# Patient Record
Sex: MALE | URBAN - METROPOLITAN AREA
[De-identification: names, ages, dates, MRNs, and addresses within clinical notes are randomized per-mention and may not be internally consistent; named-entity substitution may affect disease eponyms.]

---

## 2019-12-14 ENCOUNTER — APPOINTMENT (OUTPATIENT)
Dept: RADIOLOGY | Facility: MEDICAL CENTER | Age: 84
DRG: 246 | End: 2019-12-14
Payer: MEDICARE

## 2019-12-14 ENCOUNTER — APPOINTMENT (OUTPATIENT)
Dept: RADIOLOGY | Facility: MEDICAL CENTER | Age: 84
DRG: 246 | End: 2019-12-14
Attending: EMERGENCY MEDICINE
Payer: MEDICARE

## 2019-12-14 ENCOUNTER — HOSPITAL ENCOUNTER (INPATIENT)
Facility: MEDICAL CENTER | Age: 84
LOS: 3 days | DRG: 246 | End: 2019-12-17
Attending: EMERGENCY MEDICINE | Admitting: HOSPITALIST
Payer: MEDICARE

## 2019-12-14 ENCOUNTER — APPOINTMENT (OUTPATIENT)
Dept: CARDIOLOGY | Facility: MEDICAL CENTER | Age: 84
DRG: 246 | End: 2019-12-14
Payer: MEDICARE

## 2019-12-14 DIAGNOSIS — I21.02 ST ELEVATION MYOCARDIAL INFARCTION INVOLVING LEFT ANTERIOR DESCENDING (LAD) CORONARY ARTERY (HCC): ICD-10-CM

## 2019-12-14 LAB
ALBUMIN SERPL BCP-MCNC: 3.8 G/DL (ref 3.2–4.9)
ALBUMIN/GLOB SERPL: 1 G/DL
ALP SERPL-CCNC: 68 U/L (ref 30–99)
ALT SERPL-CCNC: 10 U/L (ref 2–50)
ANION GAP SERPL CALC-SCNC: 10 MMOL/L (ref 0–11.9)
APTT PPP: 27.5 SEC (ref 24.7–36)
AST SERPL-CCNC: 15 U/L (ref 12–45)
BASOPHILS # BLD AUTO: 0.4 % (ref 0–1.8)
BASOPHILS # BLD: 0.04 K/UL (ref 0–0.12)
BILIRUB SERPL-MCNC: 0.4 MG/DL (ref 0.1–1.5)
BUN SERPL-MCNC: 17 MG/DL (ref 8–22)
CALCIUM SERPL-MCNC: 9.3 MG/DL (ref 8.5–10.5)
CHLORIDE SERPL-SCNC: 107 MMOL/L (ref 96–112)
CO2 SERPL-SCNC: 21 MMOL/L (ref 20–33)
CREAT SERPL-MCNC: 1.2 MG/DL (ref 0.5–1.4)
EKG IMPRESSION: NORMAL
EOSINOPHIL # BLD AUTO: 0.21 K/UL (ref 0–0.51)
EOSINOPHIL NFR BLD: 2 % (ref 0–6.9)
ERYTHROCYTE [DISTWIDTH] IN BLOOD BY AUTOMATED COUNT: 43.6 FL (ref 35.9–50)
GLOBULIN SER CALC-MCNC: 3.8 G/DL (ref 1.9–3.5)
GLUCOSE SERPL-MCNC: 147 MG/DL (ref 65–99)
HCT VFR BLD AUTO: 42.5 % (ref 42–52)
HGB BLD-MCNC: 13.9 G/DL (ref 14–18)
IMM GRANULOCYTES # BLD AUTO: 0.06 K/UL (ref 0–0.11)
IMM GRANULOCYTES NFR BLD AUTO: 0.6 % (ref 0–0.9)
INR PPP: 1.08 (ref 0.87–1.13)
LIPASE SERPL-CCNC: 23 U/L (ref 11–82)
LYMPHOCYTES # BLD AUTO: 3.24 K/UL (ref 1–4.8)
LYMPHOCYTES NFR BLD: 30.7 % (ref 22–41)
MCH RBC QN AUTO: 30.1 PG (ref 27–33)
MCHC RBC AUTO-ENTMCNC: 32.7 G/DL (ref 33.7–35.3)
MCV RBC AUTO: 92 FL (ref 81.4–97.8)
MONOCYTES # BLD AUTO: 0.84 K/UL (ref 0–0.85)
MONOCYTES NFR BLD AUTO: 7.9 % (ref 0–13.4)
NEUTROPHILS # BLD AUTO: 6.18 K/UL (ref 1.82–7.42)
NEUTROPHILS NFR BLD: 58.4 % (ref 44–72)
NRBC # BLD AUTO: 0 K/UL
NRBC BLD-RTO: 0 /100 WBC
NT-PROBNP SERPL IA-MCNC: 64 PG/ML (ref 0–125)
PLATELET # BLD AUTO: 237 K/UL (ref 164–446)
PMV BLD AUTO: 8.6 FL (ref 9–12.9)
POTASSIUM SERPL-SCNC: 4 MMOL/L (ref 3.6–5.5)
PROT SERPL-MCNC: 7.6 G/DL (ref 6–8.2)
PROTHROMBIN TIME: 14.3 SEC (ref 12–14.6)
RBC # BLD AUTO: 4.62 M/UL (ref 4.7–6.1)
SODIUM SERPL-SCNC: 138 MMOL/L (ref 135–145)
TROPONIN T SERPL-MCNC: 51 NG/L (ref 6–19)
WBC # BLD AUTO: 10.6 K/UL (ref 4.8–10.8)

## 2019-12-14 PROCEDURE — 99152 MOD SED SAME PHYS/QHP 5/>YRS: CPT | Performed by: INTERNAL MEDICINE

## 2019-12-14 PROCEDURE — 85610 PROTHROMBIN TIME: CPT

## 2019-12-14 PROCEDURE — 92941 PRQ TRLML REVSC TOT OCCL AMI: CPT | Mod: LD | Performed by: INTERNAL MEDICINE

## 2019-12-14 PROCEDURE — 700105 HCHG RX REV CODE 258

## 2019-12-14 PROCEDURE — 99221 1ST HOSP IP/OBS SF/LOW 40: CPT | Mod: 25 | Performed by: INTERNAL MEDICINE

## 2019-12-14 PROCEDURE — 700117 HCHG RX CONTRAST REV CODE 255: Performed by: INTERNAL MEDICINE

## 2019-12-14 PROCEDURE — 84484 ASSAY OF TROPONIN QUANT: CPT

## 2019-12-14 PROCEDURE — 93005 ELECTROCARDIOGRAM TRACING: CPT

## 2019-12-14 PROCEDURE — A9270 NON-COVERED ITEM OR SERVICE: HCPCS

## 2019-12-14 PROCEDURE — B2151ZZ FLUOROSCOPY OF LEFT HEART USING LOW OSMOLAR CONTRAST: ICD-10-PCS | Performed by: INTERNAL MEDICINE

## 2019-12-14 PROCEDURE — 770022 HCHG ROOM/CARE - ICU (200)

## 2019-12-14 PROCEDURE — 99291 CRITICAL CARE FIRST HOUR: CPT

## 2019-12-14 PROCEDURE — 4A023N7 MEASUREMENT OF CARDIAC SAMPLING AND PRESSURE, LEFT HEART, PERCUTANEOUS APPROACH: ICD-10-PCS | Performed by: INTERNAL MEDICINE

## 2019-12-14 PROCEDURE — 83690 ASSAY OF LIPASE: CPT

## 2019-12-14 PROCEDURE — 99153 MOD SED SAME PHYS/QHP EA: CPT

## 2019-12-14 PROCEDURE — 85730 THROMBOPLASTIN TIME PARTIAL: CPT

## 2019-12-14 PROCEDURE — 700111 HCHG RX REV CODE 636 W/ 250 OVERRIDE (IP)

## 2019-12-14 PROCEDURE — B2111ZZ FLUOROSCOPY OF MULTIPLE CORONARY ARTERIES USING LOW OSMOLAR CONTRAST: ICD-10-PCS | Performed by: INTERNAL MEDICINE

## 2019-12-14 PROCEDURE — 85025 COMPLETE CBC W/AUTO DIFF WBC: CPT

## 2019-12-14 PROCEDURE — 71045 X-RAY EXAM CHEST 1 VIEW: CPT

## 2019-12-14 PROCEDURE — 700101 HCHG RX REV CODE 250

## 2019-12-14 PROCEDURE — 700102 HCHG RX REV CODE 250 W/ 637 OVERRIDE(OP)

## 2019-12-14 PROCEDURE — 99223 1ST HOSP IP/OBS HIGH 75: CPT | Mod: AI | Performed by: HOSPITALIST

## 2019-12-14 PROCEDURE — 027136Z DILATION OF CORONARY ARTERY, TWO ARTERIES WITH THREE DRUG-ELUTING INTRALUMINAL DEVICES, PERCUTANEOUS APPROACH: ICD-10-PCS | Performed by: INTERNAL MEDICINE

## 2019-12-14 PROCEDURE — 83880 ASSAY OF NATRIURETIC PEPTIDE: CPT

## 2019-12-14 PROCEDURE — 93005 ELECTROCARDIOGRAM TRACING: CPT | Performed by: EMERGENCY MEDICINE

## 2019-12-14 PROCEDURE — 80053 COMPREHEN METABOLIC PANEL: CPT

## 2019-12-14 PROCEDURE — 93458 L HRT ARTERY/VENTRICLE ANGIO: CPT | Mod: 26,59 | Performed by: INTERNAL MEDICINE

## 2019-12-14 RX ORDER — MIDAZOLAM HYDROCHLORIDE 1 MG/ML
INJECTION INTRAMUSCULAR; INTRAVENOUS
Status: COMPLETED
Start: 2019-12-14 | End: 2019-12-14

## 2019-12-14 RX ORDER — LIDOCAINE HYDROCHLORIDE 20 MG/ML
INJECTION, SOLUTION INFILTRATION; PERINEURAL
Status: COMPLETED
Start: 2019-12-14 | End: 2019-12-14

## 2019-12-14 RX ORDER — BIVALIRUDIN 250 MG/5ML
INJECTION, POWDER, LYOPHILIZED, FOR SOLUTION INTRAVENOUS
Status: COMPLETED
Start: 2019-12-14 | End: 2019-12-14

## 2019-12-14 RX ORDER — VERAPAMIL HYDROCHLORIDE 2.5 MG/ML
INJECTION, SOLUTION INTRAVENOUS
Status: COMPLETED
Start: 2019-12-14 | End: 2019-12-14

## 2019-12-14 RX ORDER — HEPARIN SODIUM,PORCINE 1000/ML
VIAL (ML) INJECTION
Status: COMPLETED
Start: 2019-12-14 | End: 2019-12-14

## 2019-12-14 RX ORDER — ONDANSETRON 2 MG/ML
4 INJECTION INTRAMUSCULAR; INTRAVENOUS EVERY 4 HOURS PRN
Status: DISCONTINUED | OUTPATIENT
Start: 2019-12-14 | End: 2019-12-17 | Stop reason: HOSPADM

## 2019-12-14 RX ORDER — LEVETIRACETAM 500 MG/1
500 TABLET ORAL 2 TIMES DAILY
COMMUNITY

## 2019-12-14 RX ORDER — ATORVASTATIN CALCIUM 40 MG/1
40 TABLET, FILM COATED ORAL
Status: DISCONTINUED | OUTPATIENT
Start: 2019-12-14 | End: 2019-12-17 | Stop reason: HOSPADM

## 2019-12-14 RX ORDER — ONDANSETRON 4 MG/1
4 TABLET, ORALLY DISINTEGRATING ORAL EVERY 4 HOURS PRN
Status: DISCONTINUED | OUTPATIENT
Start: 2019-12-14 | End: 2019-12-17 | Stop reason: HOSPADM

## 2019-12-14 RX ORDER — CLOPIDOGREL BISULFATE 75 MG/1
75 TABLET ORAL DAILY
Status: DISCONTINUED | OUTPATIENT
Start: 2019-12-15 | End: 2019-12-17 | Stop reason: HOSPADM

## 2019-12-14 RX ORDER — AMOXICILLIN 250 MG
2 CAPSULE ORAL 2 TIMES DAILY
Status: DISCONTINUED | OUTPATIENT
Start: 2019-12-14 | End: 2019-12-17 | Stop reason: HOSPADM

## 2019-12-14 RX ORDER — BISACODYL 10 MG
10 SUPPOSITORY, RECTAL RECTAL
Status: DISCONTINUED | OUTPATIENT
Start: 2019-12-14 | End: 2019-12-17 | Stop reason: HOSPADM

## 2019-12-14 RX ORDER — POLYETHYLENE GLYCOL 3350 17 G/17G
1 POWDER, FOR SOLUTION ORAL
Status: DISCONTINUED | OUTPATIENT
Start: 2019-12-14 | End: 2019-12-17 | Stop reason: HOSPADM

## 2019-12-14 RX ORDER — CLOPIDOGREL 300 MG/1
TABLET, FILM COATED ORAL
Status: COMPLETED
Start: 2019-12-14 | End: 2019-12-14

## 2019-12-14 RX ORDER — HEPARIN SODIUM 200 [USP'U]/100ML
INJECTION, SOLUTION INTRAVENOUS
Status: COMPLETED
Start: 2019-12-14 | End: 2019-12-14

## 2019-12-14 RX ADMIN — CLOPIDOGREL BISULFATE 600 MG: 300 TABLET, FILM COATED ORAL at 16:23

## 2019-12-14 RX ADMIN — LIDOCAINE HYDROCHLORIDE: 20 INJECTION, SOLUTION INFILTRATION; PERINEURAL at 15:33

## 2019-12-14 RX ADMIN — HEPARIN SODIUM 2000 UNITS: 200 INJECTION, SOLUTION INTRAVENOUS at 15:38

## 2019-12-14 RX ADMIN — BIVALIRUDIN 250 MG: 250 INJECTION INTRACAVERNOUS at 15:45

## 2019-12-14 RX ADMIN — HEPARIN SODIUM: 1000 INJECTION, SOLUTION INTRAVENOUS; SUBCUTANEOUS at 15:32

## 2019-12-14 RX ADMIN — IOHEXOL 230 ML: 350 INJECTION, SOLUTION INTRAVENOUS at 16:21

## 2019-12-14 RX ADMIN — NITROGLYCERIN 10 ML: 20 INJECTION INTRAVENOUS at 15:33

## 2019-12-14 RX ADMIN — VERAPAMIL HYDROCHLORIDE 2.5 MG: 2.5 INJECTION INTRAVENOUS at 15:33

## 2019-12-14 RX ADMIN — FENTANYL CITRATE 50 MCG: 0.05 INJECTION, SOLUTION INTRAMUSCULAR; INTRAVENOUS at 16:24

## 2019-12-14 RX ADMIN — MIDAZOLAM HYDROCHLORIDE 0.5 MG: 1 INJECTION, SOLUTION INTRAMUSCULAR; INTRAVENOUS at 16:24

## 2019-12-14 SDOH — ECONOMIC STABILITY: FOOD INSECURITY: WITHIN THE PAST 12 MONTHS, YOU WORRIED THAT YOUR FOOD WOULD RUN OUT BEFORE YOU GOT MONEY TO BUY MORE.: NEVER TRUE

## 2019-12-14 SDOH — HEALTH STABILITY: MENTAL HEALTH: HOW OFTEN DO YOU HAVE A DRINK CONTAINING ALCOHOL?: 4 OR MORE TIMES A WEEK

## 2019-12-14 SDOH — HEALTH STABILITY: MENTAL HEALTH: HOW OFTEN DO YOU HAVE 6 OR MORE DRINKS ON ONE OCCASION?: NEVER

## 2019-12-14 SDOH — ECONOMIC STABILITY: FOOD INSECURITY: WITHIN THE PAST 12 MONTHS, THE FOOD YOU BOUGHT JUST DIDN'T LAST AND YOU DIDN'T HAVE MONEY TO GET MORE.: NEVER TRUE

## 2019-12-14 SDOH — HEALTH STABILITY: MENTAL HEALTH: HOW MANY STANDARD DRINKS CONTAINING ALCOHOL DO YOU HAVE ON A TYPICAL DAY?: 1 OR 2

## 2019-12-14 SDOH — ECONOMIC STABILITY: TRANSPORTATION INSECURITY
IN THE PAST 12 MONTHS, HAS THE LACK OF TRANSPORTATION KEPT YOU FROM MEDICAL APPOINTMENTS OR FROM GETTING MEDICATIONS?: NO

## 2019-12-14 SDOH — ECONOMIC STABILITY: TRANSPORTATION INSECURITY
IN THE PAST 12 MONTHS, HAS LACK OF TRANSPORTATION KEPT YOU FROM MEETINGS, WORK, OR FROM GETTING THINGS NEEDED FOR DAILY LIVING?: NO

## 2019-12-14 ASSESSMENT — ENCOUNTER SYMPTOMS
ABDOMINAL PAIN: 0
WHEEZING: 0
NAUSEA: 0
FEVER: 0
LOSS OF CONSCIOUSNESS: 0
HEMOPTYSIS: 0
BRUISES/BLEEDS EASILY: 0
DEPRESSION: 0
COUGH: 0
MYALGIAS: 0
VOMITING: 0
BLURRED VISION: 0
NERVOUS/ANXIOUS: 0
SPEECH CHANGE: 0
CHILLS: 0
EYE DISCHARGE: 0
PALPITATIONS: 0
EYE PAIN: 0

## 2019-12-14 ASSESSMENT — COGNITIVE AND FUNCTIONAL STATUS - GENERAL
SUGGESTED CMS G CODE MODIFIER DAILY ACTIVITY: CH
DAILY ACTIVITIY SCORE: 24
SUGGESTED CMS G CODE MODIFIER MOBILITY: CH
MOBILITY SCORE: 24

## 2019-12-14 ASSESSMENT — PATIENT HEALTH QUESTIONNAIRE - PHQ9
1. LITTLE INTEREST OR PLEASURE IN DOING THINGS: NOT AT ALL
SUM OF ALL RESPONSES TO PHQ9 QUESTIONS 1 AND 2: 0
2. FEELING DOWN, DEPRESSED, IRRITABLE, OR HOPELESS: NOT AT ALL

## 2019-12-14 ASSESSMENT — LIFESTYLE VARIABLES: EVER_SMOKED: NEVER

## 2019-12-14 NOTE — CONSULTS
Cardiology consult    Requested by Dr. 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 secondary to chest pain.  Right bundle branch block with ST elevation anteriorly consistent with an acute anterior wall myocardial infarction.  Unable to tell whether sharp or dull.  Approximate 30 minutes of this.  Patient is quite healthy.  Lives independently, drives, , retired dentist.  Denies smoking or alcohol use.  Has 2 children.  Currently pain has improved slightly less ST elevation compared to the previous EKG. no previous history of MI or stroke.  On Keppra for previous seizures no other medications.  Patient received aspirin in route      MEDICATIONS:      Current Facility-Administered Medications:   •  LIDOCAINE HCL 2 % INJ SOLN, , , ,   •  HEPARIN 1000 UNITS/ML OR USE ONLY, , , ,   •  VERAPAMIL HCL 2.5 MG/ML IV SOLN, , , ,   •  HEPARIN (PORCINE) IN NACL 2000-0.9 UNIT/L-% IV SOLN, , , ,   •  NITROGLYCERIN 2 MG IV SOLN, , , ,   •  HEPARIN 1000 UNITS/ML OR USE ONLY, , , ,   No current outpatient medications on file.    ALLERGIES:   Mr. Monterroso  has no allergies on file.     SURGERIES:  Mr. Monterroso   has no past surgical history on file.     MEDICAL ILLNESSES:  Mr. Monterroso   has no past medical history on file.     SOCIAL HISTORY:  Mr. Monterroso        FAMILY HISTORY:  Mr.'s Monterroso  family history is not on file.      ROS:  Review of Systems   Constitutional: Negative for chills and fever.   HENT: Negative for congestion.    Eyes: Negative for blurred vision, pain and discharge.   Respiratory: Negative for cough, hemoptysis and wheezing.    Cardiovascular: Positive for chest pain. Negative for palpitations.   Gastrointestinal: Negative for abdominal pain, nausea and vomiting.   Musculoskeletal: Negative for joint pain and myalgias.   Skin: Negative for itching and rash.   Neurological:  Negative for speech change and loss of consciousness.   Endo/Heme/Allergies: Does not bruise/bleed easily.   Psychiatric/Behavioral: Negative for depression. The patient is not nervous/anxious.    All other systems reviewed and are negative.    In addition to the above, a complete review of system was performed and all other organs systems were normal    PHYSICAL EXAM:  Physical Exam   Constitutional: He is oriented to person, place, and time and well-developed, well-nourished, and in no distress.   HENT:   Head: Normocephalic and atraumatic.   Mouth/Throat: Oropharynx is clear and moist.   Eyes: Pupils are equal, round, and reactive to light. EOM are normal.   Neck: Normal range of motion. Neck supple. No thyromegaly present.   Cardiovascular: Normal rate, regular rhythm, normal heart sounds and intact distal pulses. Exam reveals no gallop and no friction rub.   No murmur heard.  Pulmonary/Chest: Effort normal and breath sounds normal.   Abdominal: Soft. Bowel sounds are normal.   Musculoskeletal: Normal range of motion.         General: No tenderness or edema.   Neurological: He is alert and oriented to person, place, and time.   Skin: Skin is warm and dry. No rash noted.   Psychiatric: Mood, memory, affect and judgment normal.       There were no vitals taken for this visit.     No results found for: CHOLSTRLTOT, LDL, HDL, TRIGLYCERIDE    No results found for: SODIUM, POTASSIUM, CHLORIDE, CO2, GLUCOSE, BUN, CREATININE, BUNCREATRAT, GLOMRATE  No results found for: ALKPHOSPHAT, ASTSGOT, ALTSGPT, TBILIRUBIN   No results found for: BNPBTYPENAT              EKG: Sinus rhythm, PVC, right bundle branch block, ST elevation anteriorly.  Results for orders placed or performed during the hospital encounter of 12/14/19   EKG   Result Value Ref Range    Report       [facility] Emergency Dept.    Test Date:  2019-12-14  Pt Name:    NIRMALA LYNNE                 Department: ER  MRN:        [id number]                       Room:       TRAUMA - EXAM 1  Gender:     Male                         Technician: [id number]  :        [id number]                   Requested By:ER TRIAGE PROTOCOL  Order #:    [id number]                    Reading MD:    Measurements  Intervals                                Axis  Rate:       0                            P:          0  NM:                                      QRS:        0  QRSD:                                    T:  QT:  QTc:        0    Interpretive Statements  ALL 12 LEADS ARE MISSING  MISSING LEAD(S): I,II,III,aVR,aVL,aVF,V1,V2,V3,V4,V5,V6  No previous ECG available for comparison         IMAGING:   CL-LEFT HEART CATHETERIZATION WITH POSSIBLE INTERVENTION    (Results Pending)   DX-CHEST-PORTABLE (1 VIEW)    (Results Pending)         There are no active problems to display for this patient.        ASSESSMENT AND PLAN:   1.  Acute anterior wall myocardial infarction.  Patient is being brought emergently to the cardiac catheterization lab.  2.  Right bundle branch block.  3.  Previous seizures.  The risks, benefits, and alternatives to coronary angiography with IV sedation were discussed in great detail. Specific risks mentioned include bleeding, infection, kidney damage, allergic reaction, cardiac perforation with possible tamponade requiring pericardiocentesis or possibly open heart surgery. In addition, we discussed that 10% of patients will experience small to moderate bruising at the site of the arterial puncture. Lastly, the risks of heart attack, stroke and death were discussed; the risk of major complications such as heart attack or stroke caused by the angiogram is approximately 1%; the risk of death is approximately 1 in 1000. The patient verbalized understanding of the potential complications and wishes to proceed with this procedure.

## 2019-12-14 NOTE — ED PROVIDER NOTES
ED Provider Note    CHIEF COMPLAINT  No chief complaint on file.      HPI  Derek Monterroso is a 89 y.o. male who presents as a STEMI.  Patient lives in [place].  He started having substernal chest pain that radiates up to his neck and into his back.  The pain is very poorly described.  EKG on arrival by the flight nurses demonstrate ST elevation consistent with anterior STEMI.  He is treated with aspirin and after 1 nitroglycerin spray became pain-free.  On my evaluation he denies any chest pain but states his teeth hurt a little bit.  Dr. Nathan of cardiology is in attendance upon patient's initial evaluation.  Cardiac risk factors are negative for tobacco, negative for diabetes, negative for hypertension, negative for cholesterol and no personal history of heart disease.    REVIEW OF SYSTEMS  See HPI for further details. All other systems negative.    PAST MEDICAL HISTORY  No past medical history on file.    FAMILY HISTORY  No family history on file.    SOCIAL HISTORY  Social History     Socioeconomic History   • Marital status: Not on file     Spouse name: Not on file   • Number of children: Not on file   • Years of education: Not on file   • Highest education level: Not on file   Occupational History   • Not on file   Social Needs   • Financial resource strain: Not on file   • Food insecurity:     Worry: Not on file     Inability: Not on file   • Transportation needs:     Medical: Not on file     Non-medical: Not on file   Tobacco Use   • Smoking status: Not on file   Substance and Sexual Activity   • Alcohol use: Not on file   • Drug use: Not on file   • Sexual activity: Not on file   Lifestyle   • Physical activity:     Days per week: Not on file     Minutes per session: Not on file   • Stress: Not on file   Relationships   • Social connections:     Talks on phone: Not on file     Gets together: Not on file     Attends Church service: Not on file     Active member of club or organization: Not on file      "Attends meetings of clubs or organizations: Not on file     Relationship status: Not on file   • Intimate partner violence:     Fear of current or ex partner: Not on file     Emotionally abused: Not on file     Physically abused: Not on file     Forced sexual activity: Not on file   Other Topics Concern   • Not on file   Social History Narrative   • Not on file       SURGICAL HISTORY  No past surgical history on file.    CURRENT MEDICATIONS  Home Medications    **Home medications have not yet been reviewed for this encounter**         ALLERGIES  Allergies not on file    PHYSICAL EXAM  VITAL SIGNS: Ht 1.727 m (5' 8\")   Wt 72.6 kg (160 lb)   BMI 24.33 kg/m²   Constitutional: Well developed, Well nourished, No acute distress, Non-toxic appearance.   HENT: Normocephalic, Atraumatic.  Eyes:  EOMI, Conjunctiva normal, No discharge.   Cardiovascular: Normal heart rate, Normal rhythm, No murmurs, No rubs, No gallops.   Thorax & Lungs: Clear to auscultation without wheezes, rales, or rhonchi. No chest tenderness.   Abdomen:  Soft, No tenderness, No masses, No pulsatile masses.   Skin: Warm, Dry.  Musculoskeletal: Good range of motion in all major joints.   Neurologic: Awake and alert, No focal deficits noted.       EKG  EKG Interpretation    Interpreted by emergency department physician    Rhythm: normal sinus   Rate: normal  Axis: normal  Ectopy: premature ventricular contractions (unifocal)  Conduction: Right bundle branch block  ST Segments: elevation in  anterior leads  T Waves: no acute change  Q Waves: none    Clinical Impression: Acute anterior STEMI          RADIOLOGY/PROCEDURES  CL-LEFT HEART CATHETERIZATION WITH POSSIBLE INTERVENTION    (Results Pending)   DX-CHEST-PORTABLE (1 VIEW)    (Results Pending)         COURSE & MEDICAL DECISION MAKING  Pertinent Labs & Imaging studies reviewed. (See chart for details)  This is a 89-year-old brought in as an acute STEMI.  I was called emergently to [facility] to " evaluate this patient.  Dr. Nathan of cardiology was in attendance.  Prehospital EKG demonstrates acute anterior ST elevation.  EKG on arrival here also shows ST elevation consistent with an acute STEMI.  He had Andrei been treated with aspirin and nitroglycerin and really had no pain at the time of our evaluation.  Laboratories were obtained to include a troponin which is elevated at 51.  Patient was taken emergently to the cardiac catheterization lab for intervention.  I have discussed the case with the hospitalist and they will be the primary admitting physicians.    FINAL IMPRESSION  1.  STEMI  2.   3.         Electronically signed by: Pierre Bray, 12/14/2019 3:27 PM

## 2019-12-14 NOTE — DISCHARGE PLANNING
Medical Social Work    MSW responded to STEMI. Pt was bib Care flight from [place]. Pt is a retired dentist. Pt is independent and still drives.Pt to Cath Lab.   MSW met with pt's wife Itzel [phone]. MSW escorted her to Cath lab waiting room. Dr. Nathan in to update pt's wife.

## 2019-12-15 ENCOUNTER — APPOINTMENT (OUTPATIENT)
Dept: RADIOLOGY | Facility: MEDICAL CENTER | Age: 84
DRG: 246 | End: 2019-12-15
Attending: HOSPITALIST
Payer: MEDICARE

## 2019-12-15 PROBLEM — R79.89 LFT ELEVATION: Status: ACTIVE | Noted: 2019-12-15

## 2019-12-15 PROBLEM — N17.9 AKI (ACUTE KIDNEY INJURY) (HCC): Status: ACTIVE | Noted: 2019-12-15

## 2019-12-15 PROBLEM — R73.9 HYPERGLYCEMIA: Status: ACTIVE | Noted: 2019-12-15

## 2019-12-15 PROBLEM — R56.9 SEIZURE (HCC): Status: ACTIVE | Noted: 2019-12-15

## 2019-12-15 PROBLEM — I21.02 ST ELEVATION MYOCARDIAL INFARCTION INVOLVING LEFT ANTERIOR DESCENDING (LAD) CORONARY ARTERY (HCC): Status: ACTIVE | Noted: 2019-12-15

## 2019-12-15 PROBLEM — J96.01 ACUTE RESPIRATORY FAILURE WITH HYPOXIA (HCC): Status: ACTIVE | Noted: 2019-12-15

## 2019-12-15 LAB
ALBUMIN SERPL BCP-MCNC: 3.4 G/DL (ref 3.2–4.9)
ALBUMIN/GLOB SERPL: 1.1 G/DL
ALP SERPL-CCNC: 66 U/L (ref 30–99)
ALT SERPL-CCNC: 77 U/L (ref 2–50)
ANION GAP SERPL CALC-SCNC: 9 MMOL/L (ref 0–11.9)
AST SERPL-CCNC: 422 U/L (ref 12–45)
BASOPHILS # BLD AUTO: 0.3 % (ref 0–1.8)
BASOPHILS # BLD: 0.02 K/UL (ref 0–0.12)
BILIRUB SERPL-MCNC: 0.5 MG/DL (ref 0.1–1.5)
BUN SERPL-MCNC: 18 MG/DL (ref 8–22)
CALCIUM SERPL-MCNC: 9.3 MG/DL (ref 8.5–10.5)
CHLORIDE SERPL-SCNC: 106 MMOL/L (ref 96–112)
CHOLEST SERPL-MCNC: 129 MG/DL (ref 100–199)
CO2 SERPL-SCNC: 19 MMOL/L (ref 20–33)
CREAT SERPL-MCNC: 1.16 MG/DL (ref 0.5–1.4)
EKG IMPRESSION: NORMAL
EOSINOPHIL # BLD AUTO: 0.11 K/UL (ref 0–0.51)
EOSINOPHIL NFR BLD: 1.4 % (ref 0–6.9)
ERYTHROCYTE [DISTWIDTH] IN BLOOD BY AUTOMATED COUNT: 42.6 FL (ref 35.9–50)
GLOBULIN SER CALC-MCNC: 3.2 G/DL (ref 1.9–3.5)
GLUCOSE SERPL-MCNC: 116 MG/DL (ref 65–99)
HCT VFR BLD AUTO: 37.3 % (ref 42–52)
HDLC SERPL-MCNC: 42 MG/DL
HGB BLD-MCNC: 12.4 G/DL (ref 14–18)
IMM GRANULOCYTES # BLD AUTO: 0.01 K/UL (ref 0–0.11)
IMM GRANULOCYTES NFR BLD AUTO: 0.1 % (ref 0–0.9)
LDLC SERPL CALC-MCNC: 65 MG/DL
LYMPHOCYTES # BLD AUTO: 2.37 K/UL (ref 1–4.8)
LYMPHOCYTES NFR BLD: 31 % (ref 22–41)
MCH RBC QN AUTO: 30 PG (ref 27–33)
MCHC RBC AUTO-ENTMCNC: 33.2 G/DL (ref 33.7–35.3)
MCV RBC AUTO: 90.1 FL (ref 81.4–97.8)
MONOCYTES # BLD AUTO: 0.91 K/UL (ref 0–0.85)
MONOCYTES NFR BLD AUTO: 11.9 % (ref 0–13.4)
NEUTROPHILS # BLD AUTO: 4.22 K/UL (ref 1.82–7.42)
NEUTROPHILS NFR BLD: 55.3 % (ref 44–72)
NRBC # BLD AUTO: 0 K/UL
NRBC BLD-RTO: 0 /100 WBC
PLATELET # BLD AUTO: 209 K/UL (ref 164–446)
PMV BLD AUTO: 9 FL (ref 9–12.9)
POTASSIUM SERPL-SCNC: 4.1 MMOL/L (ref 3.6–5.5)
PROT SERPL-MCNC: 6.6 G/DL (ref 6–8.2)
RBC # BLD AUTO: 4.14 M/UL (ref 4.7–6.1)
SODIUM SERPL-SCNC: 134 MMOL/L (ref 135–145)
TRIGL SERPL-MCNC: 110 MG/DL (ref 0–149)
WBC # BLD AUTO: 7.6 K/UL (ref 4.8–10.8)

## 2019-12-15 PROCEDURE — 700111 HCHG RX REV CODE 636 W/ 250 OVERRIDE (IP): Performed by: HOSPITALIST

## 2019-12-15 PROCEDURE — 93010 ELECTROCARDIOGRAM REPORT: CPT | Performed by: INTERNAL MEDICINE

## 2019-12-15 PROCEDURE — 80053 COMPREHEN METABOLIC PANEL: CPT

## 2019-12-15 PROCEDURE — 700105 HCHG RX REV CODE 258: Performed by: HOSPITALIST

## 2019-12-15 PROCEDURE — 770022 HCHG ROOM/CARE - ICU (200)

## 2019-12-15 PROCEDURE — 99233 SBSQ HOSP IP/OBS HIGH 50: CPT | Performed by: HOSPITALIST

## 2019-12-15 PROCEDURE — 83036 HEMOGLOBIN GLYCOSYLATED A1C: CPT

## 2019-12-15 PROCEDURE — A9270 NON-COVERED ITEM OR SERVICE: HCPCS | Performed by: HOSPITALIST

## 2019-12-15 PROCEDURE — 85025 COMPLETE CBC W/AUTO DIFF WBC: CPT

## 2019-12-15 PROCEDURE — 700102 HCHG RX REV CODE 250 W/ 637 OVERRIDE(OP): Performed by: HOSPITALIST

## 2019-12-15 PROCEDURE — 80061 LIPID PANEL: CPT

## 2019-12-15 PROCEDURE — 99232 SBSQ HOSP IP/OBS MODERATE 35: CPT | Performed by: INTERNAL MEDICINE

## 2019-12-15 PROCEDURE — 71045 X-RAY EXAM CHEST 1 VIEW: CPT

## 2019-12-15 PROCEDURE — 93005 ELECTROCARDIOGRAM TRACING: CPT | Performed by: INTERNAL MEDICINE

## 2019-12-15 PROCEDURE — 700102 HCHG RX REV CODE 250 W/ 637 OVERRIDE(OP): Performed by: INTERNAL MEDICINE

## 2019-12-15 PROCEDURE — A9270 NON-COVERED ITEM OR SERVICE: HCPCS | Performed by: INTERNAL MEDICINE

## 2019-12-15 RX ORDER — SODIUM CHLORIDE 9 MG/ML
INJECTION, SOLUTION INTRAVENOUS CONTINUOUS
Status: DISCONTINUED | OUTPATIENT
Start: 2019-12-15 | End: 2019-12-15

## 2019-12-15 RX ORDER — LEVETIRACETAM 500 MG/1
500 TABLET ORAL 2 TIMES DAILY
Status: DISCONTINUED | OUTPATIENT
Start: 2019-12-15 | End: 2019-12-17 | Stop reason: HOSPADM

## 2019-12-15 RX ADMIN — CLOPIDOGREL BISULFATE 75 MG: 75 TABLET ORAL at 04:42

## 2019-12-15 RX ADMIN — ATORVASTATIN CALCIUM 40 MG: 40 TABLET, FILM COATED ORAL at 00:51

## 2019-12-15 RX ADMIN — ENOXAPARIN SODIUM 40 MG: 100 INJECTION SUBCUTANEOUS at 17:52

## 2019-12-15 RX ADMIN — LEVETIRACETAM 500 MG: 500 TABLET ORAL at 06:02

## 2019-12-15 RX ADMIN — LEVETIRACETAM 500 MG: 500 TABLET ORAL at 17:52

## 2019-12-15 RX ADMIN — ATORVASTATIN CALCIUM 40 MG: 40 TABLET, FILM COATED ORAL at 20:35

## 2019-12-15 RX ADMIN — SODIUM CHLORIDE: 9 INJECTION, SOLUTION INTRAVENOUS at 00:49

## 2019-12-15 RX ADMIN — ASPIRIN 81 MG: 81 TABLET, COATED ORAL at 04:42

## 2019-12-15 ASSESSMENT — ENCOUNTER SYMPTOMS
TINGLING: 0
CONSTIPATION: 0
BLOOD IN STOOL: 0
HEARTBURN: 0
BACK PAIN: 0
NAUSEA: 0
MYALGIAS: 0
HEADACHES: 0
FALLS: 0
ORTHOPNEA: 1
PALPITATIONS: 0
NAUSEA: 1
DEPRESSION: 0
TREMORS: 0
BRUISES/BLEEDS EASILY: 0
CHILLS: 0
PND: 0
CONSTITUTIONAL NEGATIVE: 1
VOMITING: 0
NERVOUS/ANXIOUS: 0
SHORTNESS OF BREATH: 0
MUSCULOSKELETAL NEGATIVE: 1
COUGH: 0
NERVOUS/ANXIOUS: 1
POLYDIPSIA: 0
WEAKNESS: 0
FLANK PAIN: 0
PSYCHIATRIC NEGATIVE: 1
SORE THROAT: 0
CARDIOVASCULAR NEGATIVE: 1
NEUROLOGICAL NEGATIVE: 1
FOCAL WEAKNESS: 0
BLURRED VISION: 0
SHORTNESS OF BREATH: 1
ABDOMINAL PAIN: 0
WHEEZING: 0
FEVER: 0
DIARRHEA: 0
EYES NEGATIVE: 1
DIZZINESS: 0
PHOTOPHOBIA: 0
STRIDOR: 0
DIAPHORESIS: 1
SINUS PAIN: 0
RESPIRATORY NEGATIVE: 1
NECK PAIN: 0
DOUBLE VISION: 0
EYE PAIN: 0
HALLUCINATIONS: 0
GASTROINTESTINAL NEGATIVE: 1
CLAUDICATION: 0
SPUTUM PRODUCTION: 0
HEMOPTYSIS: 0

## 2019-12-15 ASSESSMENT — LIFESTYLE VARIABLES
HOW MANY TIMES IN THE PAST YEAR HAVE YOU HAD 5 OR MORE DRINKS IN A DAY: 0
ON A TYPICAL DAY WHEN YOU DRINK ALCOHOL HOW MANY DRINKS DO YOU HAVE: 0
TOTAL SCORE: 0
DOES PATIENT WANT TO STOP DRINKING: NO
SUBSTANCE_ABUSE: 0
ALCOHOL_USE: NO
TOTAL SCORE: 0
TOTAL SCORE: 0
CONSUMPTION TOTAL: NEGATIVE
EVER HAD A DRINK FIRST THING IN THE MORNING TO STEADY YOUR NERVES TO GET RID OF A HANGOVER: NO
EVER FELT BAD OR GUILTY ABOUT YOUR DRINKING: NO
HAVE PEOPLE ANNOYED YOU BY CRITICIZING YOUR DRINKING: NO
AVERAGE NUMBER OF DAYS PER WEEK YOU HAVE A DRINK CONTAINING ALCOHOL: 0
HAVE YOU EVER FELT YOU SHOULD CUT DOWN ON YOUR DRINKING: NO

## 2019-12-15 NOTE — RESPIRATORY CARE
COPD EDUCATION by COPD CLINICAL EDUCATOR  12/15/2019 at 8:19 AM by Elayne Hall     Patient reviewed by COPD education team. Patient does not have a history or diagnosis of COPD and is a non-smoker, therefore does not qualify for the COPD program.

## 2019-12-15 NOTE — ASSESSMENT & PLAN NOTE
Admitted for chest and STEMI seen on EKG. Troponins were elevated.   Cardiology was consulted.  Status post PCI, 3 MERLENE was placed.   Dual antiplatelet therapy, high intensity statin  Soft blood pressure and low heart rate not allowing for ACE elevation and beta-blockade currently  High dose statin goal LDL <65  Await echocardiogram, cardiac cath reported a reduction of EF to 30%

## 2019-12-15 NOTE — PROGRESS NOTES
Cardiology Follow Up Progress Note    Date of Service  12/15/2019    Attending Physician  No att. providers found    Chief Complaint   STEMI, percutaneous intervention with stent placements, dyslipidemia.    ABIDA Monterroso is a 89 y.o. male admitted 12/14/2019 with above.    Interim Events  He underwent cardiac catheterization with resolution of his chest pain.    Review of Systems  Review of Systems   Constitutional: Negative.  Negative for chills and fever.   HENT: Negative.  Negative for hearing loss.    Eyes: Negative.    Respiratory: Negative.  Negative for cough and shortness of breath.    Cardiovascular: Negative.  Negative for chest pain, palpitations and leg swelling.   Gastrointestinal: Negative.  Negative for abdominal pain, nausea and vomiting.   Genitourinary: Negative.  Negative for dysuria and urgency.   Musculoskeletal: Negative.  Negative for myalgias.   Skin: Negative.  Negative for rash.   Neurological: Negative.  Negative for dizziness, weakness and headaches.   Hematological: Does not bruise/bleed easily.   Psychiatric/Behavioral: Negative.  The patient is not nervous/anxious.        Vital signs in last 24 hours  Temp:  [36.6 °C (97.8 °F)-36.7 °C (98 °F)] 36.7 °C (98 °F)  Pulse:  [56-72] 61  Resp:  [13-24] 24  BP: ()/(45-63) 87/58  SpO2:  [88 %-100 %] 97 %    Physical Exam  Physical Exam  Constitutional:       Appearance: He is well-developed.   HENT:      Head: Normocephalic and atraumatic.   Eyes:      Conjunctiva/sclera: Conjunctivae normal.      Pupils: Pupils are equal, round, and reactive to light.   Neck:      Musculoskeletal: Normal range of motion and neck supple.   Cardiovascular:      Rate and Rhythm: Normal rate and regular rhythm.   Pulmonary:      Effort: Pulmonary effort is normal.      Breath sounds: Normal breath sounds.   Abdominal:      General: Bowel sounds are normal.      Palpations: Abdomen is soft.   Musculoskeletal: Normal range of motion.   Skin:     General:  Skin is warm and dry.   Neurological:      Mental Status: He is alert and oriented to person, place, and time.         Lab Review  Lab Results   Component Value Date/Time    WBC 7.6 12/15/2019 04:37 AM    RBC 4.14 (L) 12/15/2019 04:37 AM    HEMOGLOBIN 12.4 (L) 12/15/2019 04:37 AM    HEMATOCRIT 37.3 (L) 12/15/2019 04:37 AM    MCV 90.1 12/15/2019 04:37 AM    MCH 30.0 12/15/2019 04:37 AM    MCHC 33.2 (L) 12/15/2019 04:37 AM    MPV 9.0 12/15/2019 04:37 AM      Lab Results   Component Value Date/Time    SODIUM 134 (L) 12/15/2019 04:37 AM    POTASSIUM 4.1 12/15/2019 04:37 AM    CHLORIDE 106 12/15/2019 04:37 AM    CO2 19 (L) 12/15/2019 04:37 AM    GLUCOSE 116 (H) 12/15/2019 04:37 AM    BUN 18 12/15/2019 04:37 AM    CREATININE 1.16 12/15/2019 04:37 AM      Lab Results   Component Value Date/Time    ASTSGOT 422 (H) 12/15/2019 04:37 AM    ALTSGPT 77 (H) 12/15/2019 04:37 AM     Lab Results   Component Value Date/Time    CHOLSTRLTOT 129 12/15/2019 04:37 AM    LDL 65 12/15/2019 04:37 AM    HDL 42 12/15/2019 04:37 AM    TRIGLYCERIDE 110 12/15/2019 04:37 AM    TROPONINT 51 (H) 12/14/2019 03:02 PM       Recent Labs     12/14/19  1502   NTPROBNP 64       Cardiac Imaging and Procedures Review  CARDIAC STUDIES/PROCEDURES:    CARDIAC CATHETERIZATION CONCLUSIONS (12/14/19)  1.  Multivessel coronary artery disease with high-grade ostial left anterior   descending artery, mid left anterior descending artery, proximal diagonal   branch stenosis and nonobstructive proximal right coronary artery stenosis.  2.  Successful percutaneous transluminal coronary angioplasty/stent placement   of the ostial left anterior descending artery with 2.5x12 mm Resolute   drug-eluting stent.  3.  Successful percutaneous transluminal coronary angioplasty/stent placement   of the mid left anterior descending artery with 2.5x22 mm Maynard drug-eluting   stent.  4.  Successful percutaneous transluminal coronary angioplasty/stent placement   of the proximal diagonal  branch with 2.5x15 mm Resolute drug-eluting stent.  5.  Reduced left ventricular systolic function with ejection fraction of 30%,   anterior wall hypokinesis.  6.  Elevated left ventricular end-diastolic pressure.  (study result reviewed)    EKG performed on (12/14/19) was reviewed: EKG personally interpreted shows sinus rhythm with anterior ST elevation.    Assessment/Plan    1. STEMI and coronary artery disease with stent placement: He is clinically doing well without recurrence of his angina.  We will continue with current medical care including aspirin, clopidogrel and atorvastatin. Currently both ace inhibitor therapy and beta blockade therapy are unable to be initiated due to low blood pressure. He may be transferred to telemetry.  2. Hyperlipidemia: He is doing well on statin therapy without myalgia symptoms.    Thank you for allowing me to participate in the care of this patient.  I will continue to follow this patient    Please contact me with any questions.    Khoi Apodaca M.D.   Cardiologist, [facility]  [phone] - [phone]

## 2019-12-15 NOTE — PROCEDURES
DATE OF SERVICE:  12/14/2019    REFERRING PHYSICIAN:  Jose Nathan MD    PROCEDURES:  1.  Left heart catheterization.  2.  Coronary angiography.  3.  PTCA/stent placement of the ostial left anterior descending artery.  4.  PTCA/stent placement of the mid left anterior descending artery.  5.  PTCA/stent placement of the proximal diagonal branch.  6.  Left ventriculogram.  7.  Monitor conscious sedation.    PREPROCEDURE DIAGNOSES:  Acute anterior myocardial infarction, ST elevation   myocardial infarction.    POSTPROCEDURE DIAGNOSES:  1.  Multivessel coronary artery disease with high-grade ostial left anterior   descending artery, mid left anterior descending artery, proximal diagonal   branch stenosis and nonobstructive proximal right coronary artery stenosis.  2.  Successful percutaneous transluminal coronary angioplasty/stent placement   of the ostial left anterior descending artery with 2.5x12 mm Resolute   drug-eluting stent.  3.  Successful percutaneous transluminal coronary angioplasty/stent placement   of the mid left anterior descending artery with 2.5x22 mm Bailey drug-eluting   stent.  4.  Successful percutaneous transluminal coronary angioplasty/stent placement   of the proximal diagonal branch with 2.5x15 mm Resolute drug-eluting stent.  5.  Reduced left ventricular systolic function with ejection fraction of 30%,   anterior wall hypokinesis.  6.  Elevated left ventricular end-diastolic pressure.    INDICATION:  The patient is a 89-year-old male with a history of   hyperlipidemia.  The patient was brought to [facility] with chest   pain and EKG showed anterior myocardial infarction.  The patient was brought   to cardiac catheterization laboratory for urgent percutaneous intervention.    DESCRIPTION OF PROCEDURE:  The patient was brought to the cardiac   catheterization laboratory.  He was prepped and draped in the usual sterile   manner.  The right wrist area was anesthetized with 2%  Xylocaine.  A 6-Vietnamese   sheath was inserted into the right radial artery using the modified Seldinger   technique.  Intra-arterial verapamil and nitroglycerin were given.  IV heparin   was given.  A 4-Vietnamese pigtail catheter was positioned into the left   ventricle.  Left angiography was performed.  This was exchanged for EBU 3.5   guide catheter, which was positioned into the left main coronary artery.    Coronary angiography was performed.  IV Angiomax was started.  A  150   wire was used to cross the identified LAD stenosis.  The proximal and mid   portion were predilated with 2.5x15 mm Emerge balloon.  A 2.5x22 mm [place] stent   was successfully positioned and deployed in the mid portion.  This stent was   postdilated with 2.5x15 mm NC Emerge balloon.  This wire was directed into the   diagonal branch.  The stenosis was predilated with 2.5x15 mm Emerge balloon.    A 2.5x15 mm Resolute stent was successfully positioned and deployed.  A   2.5x12 mm Resolute stent was successfully positioned and deployed in the   ostial left anterior descending artery.  Attempts were made to deliver a 2.5x8   mm NC Emerge balloon to post dilate the left anterior descending artery;   however, this was unsuccessful.  The wire and balloon were removed.  The guide   was exchanged for a TIG catheter, which was positioned into the right   coronary artery.  Coronary angiography was performed.  The patient tolerated   the procedure well.  At the end of procedure, all catheters and sheaths were   removed.  Hemoband was placed on the right wrist.  He was given oral Plavix   and transferred to [facility] in stable condition.    HEMODYNAMIC DATA:  Hemodynamic data shows aortic pressures of 100/60 with mean   of 80 mmHg and /20 with LVEDP 30 mmHg.    AORTIC VALVE:  There was no significant gradient noted.    LEFT VENTRICULOGRAM:  A 10 mL of contrast was delivered for 3 seconds.    Ejection fraction was estimated to be 30%.  There was  anterior wall   hypokinesis noted.    ANGIOGRAM:  LEFT MAIN CORONARY ARTERY:  Left main coronary artery is a short large-caliber   vessel free of disease.  LEFT ANTERIOR DESCENDING ARTERY:  Left anterior descending artery is a long   moderate-caliber vessel, which wraps around the apex.  Ostial portion of the   vessel, there is a concentric 99% stenosis.  There are diffuse luminal   irregularity throughout to the mid portion, at which there is a concentric 90%   stenosis.  There is a large-caliber diagonal branch noted with proximal   concentric 95% stenosis.  LEFT CIRCUMFLEX ARTERY:  Left circumflex artery is a nondominant   moderate-caliber vessel with diffuse luminal irregularities of 10-20%.  There   are moderate-caliber first obtuse marginal branch, small second obtuse   marginal branch and distal large-caliber bifurcating obtuse marginal branch   with luminal irregularities of 10-20%.  RIGHT CORONARY ARTERY:  Right coronary artery is a dominant large caliber   vessel with proximal concentric 60% stenosis.  Distally, there is a moderate   length, moderate caliber posterior descending artery noted free of disease.    PERCUTANEOUS CORONARY INTERVENTION:  1.  Ostial left anterior descending artery, concentric 99% stenosis with 0%   residual.  FADI-1 flow to FADI-3 flow.  Predilatation with 2.5x15 mm Emerge   balloon.  Stent with 2.5x12 mm Resolute drug-eluting stent.  2.  Mid left anterior descending artery concentric 90% stenosis with 0%   residual.  Predilatation with 2.5x15 mm Emerge balloon.  Stent with 2.5x22 mm   Elliot drug-eluting stent.  Postdilatation with 2.5x15 mm NC Emerge balloon.  3.  Proximal diagonal branch, concentric 95% stenosis with 0% residual.    Predilatation with 2.5x15 mm Emerge balloon.  Stent with 2.5x15 mm Resolute   drug-eluting stent.    IMPRESSION:   1.  Multivessel coronary artery disease with high-grade ostial left anterior   descending artery, mid left anterior descending artery,  proximal diagonal   branch stenosis and nonobstructive proximal right coronary artery stenosis.  2.  Successful percutaneous transluminal coronary angioplasty/stent placement   of the ostial left anterior descending artery with 2.5x12 mm Resolute   drug-eluting stent.  3.  Successful percutaneous transluminal coronary angioplasty/stent placement   of the mid left anterior descending artery with 2.5x22 mm Elliot drug-eluting   stent.  4.  Successful percutaneous transluminal coronary angioplasty/stent placement   of the proximal diagonal branch with 2.5x15 mm Resolute drug-eluting stent.  5.  Reduced left ventricular systolic function with ejection fraction of 30%,   anterior wall hypokinesis.  6.  Elevated left ventricular end-diastolic pressure.    RECOMMENDATIONS:  Recommend medical therapy with addition of Plavix.    SEDATION TIME: The patient's sedation was managed by myself with continuous   face to face time with the patient for 15 minutes from 15:16 to 15:31.   .       ____________________________________     MD LAITH LAUREANO / STEPHANIE    DD:  12/14/2019 16:36:18  DT:  12/14/2019 17:12:45    D#:  [id number]  Job#:  [id number]

## 2019-12-15 NOTE — PROGRESS NOTES
Patient given printed handout with medication information for Lipitor.    Gonzalez Leigh RN, BSN, [facility], CCRN

## 2019-12-15 NOTE — PROGRESS NOTES
Hospital Medicine Daily Progress Note    Date of Service  12/15/2019    Chief Complaint  89 y.o. male admitted 12/14/2019 with a history of seizure disorder on Keppra, no other major medical history reported, presenting to the emergency room after evaluation in outlying facility for chest pain, the patient presents with soft blood pressures and a heart rate of 62, anterior ST elevation diagnosed and rushed to the Cath Lab where the patient had 3 drug-eluting stents placed.  The patient transferred to his [facility] post intervention    Hospital Course    Anterior STEMI      Interval Problem Update  Patient seen and examined today. ICU Care  Care and plan discussed in IDT/Hot rounds.  Lines and assistive devices reviewed.    Patient tolerating treatment and therapies.  All Data, Medication data reviewed.  Case discussed with nursing as available.  Plan of Care reviewed with patient and notified of changes.  12/15 the patient feels better, no further chest pain, he is worried about his medication regimen, family at the bedside.  No significant dyspnea, remains on low-flow oxygen, bilateral pulmonary rales  Consultants/Specialty  Cardiology    Code Status  Full code    Disposition  Cardiac intensive care    Review of Systems  Review of Systems   Constitutional: Negative.  Negative for chills and fever.   HENT: Negative.    Eyes: Negative.    Respiratory: Positive for shortness of breath. Negative for cough.    Cardiovascular: Negative.  Negative for chest pain and palpitations.   Gastrointestinal: Negative.  Negative for heartburn, nausea and vomiting.   Genitourinary: Negative.  Negative for dysuria and frequency.   Musculoskeletal: Negative.  Negative for back pain and neck pain.   Skin: Negative.  Negative for itching and rash.   Neurological: Negative.  Negative for dizziness, focal weakness, weakness and headaches.   Endo/Heme/Allergies: Negative.  Negative for polydipsia. Does not bruise/bleed easily.    Psychiatric/Behavioral: Negative for depression. The patient is nervous/anxious.         Physical Exam  Temp:  [36.6 °C (97.8 °F)-36.7 °C (98 °F)] 36.7 °C (98 °F)  Pulse:  [56-72] 61  Resp:  [13-24] 24  BP: ()/(45-63) 87/58  SpO2:  [88 %-100 %] 97 %    Physical Exam  Vitals signs and nursing note reviewed.   Constitutional:       Appearance: He is well-developed.      Comments: Pt seen and examined.   HENT:      Head: Normocephalic and atraumatic.   Eyes:      Pupils: Pupils are equal, round, and reactive to light.   Neck:      Musculoskeletal: Normal range of motion and neck supple.   Cardiovascular:      Rate and Rhythm: Normal rate.      Heart sounds: Normal heart sounds.   Pulmonary:      Effort: Pulmonary effort is normal.      Breath sounds: Rales present.   Abdominal:      General: Bowel sounds are normal.      Palpations: Abdomen is soft.   Genitourinary:     Penis: Normal.    Musculoskeletal: Normal range of motion.   Skin:     General: Skin is warm and dry.   Neurological:      Mental Status: He is alert and oriented to person, place, and time.   Psychiatric:         Behavior: Behavior normal.         Fluids    Intake/Output Summary (Last 24 hours) at 12/15/2019 0810  Last data filed at 12/14/2019 2000  Gross per 24 hour   Intake 361 ml   Output --   Net 361 ml       Laboratory  Recent Labs     12/14/19  1502 12/15/19  0437   WBC 10.6 7.6   RBC 4.62* 4.14*   HEMOGLOBIN 13.9* 12.4*   HEMATOCRIT 42.5 37.3*   MCV 92.0 90.1   MCH 30.1 30.0   MCHC 32.7* 33.2*   RDW 43.6 42.6   PLATELETCT 237 209   MPV 8.6* 9.0     Recent Labs     12/14/19  1502 12/15/19  0437   SODIUM 138 134*   POTASSIUM 4.0 4.1   CHLORIDE 107 106   CO2 21 19*   GLUCOSE 147* 116*   BUN 17 18   CREATININE 1.20 1.16   CALCIUM 9.3 9.3     Recent Labs     12/14/19  1502   APTT 27.5   INR 1.08         Recent Labs     12/15/19  0437   TRIGLYCERIDE 110   HDL 42   LDL 65       Imaging  DX-CHEST-PORTABLE (1 VIEW)   Final Result         1.   Ill-defined pulmonary opacifications have decreased compared to prior radiograph. Findings could indicate decrease in pulmonary edema or inflammation.      2.  Linear opacifications in each lung base could be due to discoid atelectasis.      DX-CHEST-PORTABLE (1 VIEW)   Final Result         1.  Perihilar and interstitial opacifications are noted which could be due to edema or inflammation.      2.  No consolidations identified.      CL-LEFT HEART CATHETERIZATION WITH POSSIBLE INTERVENTION    (Results Pending)   EC-ECHOCARDIOGRAM COMPLETE W/O CONT    (Results Pending)        Assessment/Plan  * ST elevation myocardial infarction involving left anterior descending (LAD) coronary artery (HCC)  Assessment & Plan  Admitted for chest and STEMI seen on EKG. Troponins were elevated.   Cardiology was consulted.  Status post PCI, 3 MERLENE was placed.   Dual antiplatelet therapy, high intensity statin  Soft blood pressure and low heart rate not allowing for ACE elevation and beta-blockade currently  High dose statin goal LDL <65  Await echocardiogram, cardiac cath reported a reduction of EF to 30%      RENATA (acute kidney injury) ([facility])  Assessment & Plan  Possibly from transient hypotension, improved, stop fluids in light of low EF        LFT elevation  Assessment & Plan  Possibly from shock liver, low perfusion    Seizure (HCC)  Assessment & Plan  Cont home keppra    Acute respiratory failure with hypoxia (HCC)  Assessment & Plan  2 L of O2 above baseline  We will have to wean off oxygen as necessary    Hyperglycemia  Assessment & Plan  Follow-up on hemoglobin A1c     Plan  Stop IV fluids  Consider slight diuresis, follow-up chest x-ray  Check echocardiogram  Continue dual antiplatelet therapy  Continue statin  Currently blood pressure and low heart rate not allowing for beta-blockade or exacerbation  Close monitoring  The orders  Medically complex high risk    VTE prophylaxis: Lovenox  I have performed a physical exam and reviewed  and updated ROS and Plan today . In review of yesterday's note , there are no changes except as documented above.

## 2019-12-15 NOTE — PROGRESS NOTES
Patient on unit at approximately 1640. A&O x 4. Negative for pain, nausea, SOB. Right radial sheath site has TRBand in place with no drainage. 12mL instilled, 2mL removed per Cath Lab.    Chest Xray at 1658. Wife bedside.

## 2019-12-15 NOTE — H&P
Hospital Medicine History & Physical Note    Date of Service  12/14/2019    Primary Care Physician  No primary care provider on file.    Consultants  Cardiology    Code Status  Full    Chief Complaint  Chest pain    History of Presenting Illness  89 y.o. male who presented 12/14/2019 with history of seizures comes into the emergency room with complaints of substernal chest pain radiating to the neck and to the back that lasted for 30 minutes.  The chest pain happens at rest and exertion.  Pain is non-positional. Patient states that she is associated with nausea.  Upon arrival to the emergency room her blood pressure was 95/56, heart rate 62, respiratory 20  EKG interpreted by me normal sinus rhythm, right bundle branch block with ST elevations in anterior precordial leads greater than 5 mm.  Chest x-ray interpreted by me found peribronchial cuffing and increased intravascular congestion indicating pulmonary edema  Initial troponin was 51  Patient was taken to emergent Cath Lab and three drug-eluting stents were placed    Review of Systems  Review of Systems   Constitutional: Positive for diaphoresis and malaise/fatigue. Negative for chills and fever.   HENT: Negative for congestion, ear discharge, ear pain, hearing loss, nosebleeds, sinus pain, sore throat and tinnitus.    Eyes: Negative for blurred vision, double vision, photophobia and pain.   Respiratory: Positive for shortness of breath. Negative for cough, hemoptysis, sputum production, wheezing and stridor.    Cardiovascular: Positive for chest pain and orthopnea. Negative for palpitations, claudication, leg swelling and PND.   Gastrointestinal: Positive for nausea. Negative for abdominal pain, blood in stool, constipation, diarrhea, heartburn, melena and vomiting.   Genitourinary: Negative for dysuria, flank pain, frequency, hematuria and urgency.   Musculoskeletal: Negative for back pain, falls, joint pain, myalgias and neck pain.   Skin: Negative for  itching and rash.   Neurological: Negative for dizziness, tingling, tremors, weakness and headaches.   Endo/Heme/Allergies: Negative for environmental allergies and polydipsia. Does not bruise/bleed easily.   Psychiatric/Behavioral: Negative for depression, hallucinations, substance abuse and suicidal ideas.       Past Medical History  History of seizures    Surgical History  Surgical history reviewed and not pertinent    Family History  Patient denies having history of MI, hypertension, diabetes    Social History   reports that he has never smoked. He has never used smokeless tobacco. He reports current alcohol use of about 4.8 oz of alcohol per week. He reports that he does not use drugs.    Allergies  Allergies   Allergen Reactions   • Penicillins Rash     Experienced over 50 years ago       Medications  None       Physical Exam  Temp:  [36.6 °C (97.8 °F)] 36.6 °C (97.8 °F)  Pulse:  [56-72] 56  Resp:  [13-22] 16  BP: ()/(45-63) 91/55  SpO2:  [88 %-100 %] 91 %    Physical Exam  Vitals signs and nursing note reviewed.   Constitutional:       General: He is not in acute distress.     Appearance: Normal appearance. He is not ill-appearing, toxic-appearing or diaphoretic.   HENT:      Head: Normocephalic and atraumatic.      Nose: No congestion or rhinorrhea.      Mouth/Throat:      Pharynx: No oropharyngeal exudate or posterior oropharyngeal erythema.   Eyes:      General: No scleral icterus.  Neck:      Musculoskeletal: No neck rigidity or muscular tenderness.      Vascular: No carotid bruit.      Comments: Increase JVD  Cardiovascular:      Rate and Rhythm: Normal rate and regular rhythm.      Pulses: Normal pulses.      Heart sounds: No murmur. No friction rub. No gallop.    Pulmonary:      Effort: Pulmonary effort is normal. No respiratory distress.      Breath sounds: No stridor. No wheezing, rhonchi or rales.   Abdominal:      General: Abdomen is flat. There is no distension.      Palpations: There is no  mass.      Tenderness: There is no tenderness. There is no right CVA tenderness, left CVA tenderness, guarding or rebound.      Hernia: No hernia is present.   Musculoskeletal: Normal range of motion.         General: No swelling.      Right lower leg: No edema.      Left lower leg: No edema.   Lymphadenopathy:      Cervical: No cervical adenopathy.   Skin:     General: Skin is warm and dry.      Capillary Refill: Capillary refill takes more than 3 seconds.      Coloration: Skin is not jaundiced or pale.      Findings: No bruising or erythema.   Neurological:      Mental Status: He is alert.         Laboratory:  Recent Labs     12/14/19  1502   WBC 10.6   RBC 4.62*   HEMOGLOBIN 13.9*   HEMATOCRIT 42.5   MCV 92.0   MCH 30.1   MCHC 32.7*   RDW 43.6   PLATELETCT 237   MPV 8.6*     Recent Labs     12/14/19  1502   SODIUM 138   POTASSIUM 4.0   CHLORIDE 107   CO2 21   GLUCOSE 147*   BUN 17   CREATININE 1.20   CALCIUM 9.3     Recent Labs     12/14/19  1502   ALTSGPT 10   ASTSGOT 15   ALKPHOSPHAT 68   TBILIRUBIN 0.4   LIPASE 23   GLUCOSE 147*     Recent Labs     12/14/19  1502   APTT 27.5   INR 1.08     Recent Labs     12/14/19  1502   NTPROBNP 64         Recent Labs     12/14/19  1502   TROPONINT 51*       Urinalysis:    No results found     Imaging:  DX-CHEST-PORTABLE (1 VIEW)   Final Result         1.  Perihilar and interstitial opacifications are noted which could be due to edema or inflammation.      2.  No consolidations identified.      CL-LEFT HEART CATHETERIZATION WITH POSSIBLE INTERVENTION    (Results Pending)   EC-ECHOCARDIOGRAM COMPLETE W/O CONT    (Results Pending)         Assessment/Plan:  I anticipate this patient will require at least two midnights for appropriate medical management, necessitating inpatient admission.    * ST elevation myocardial infarction involving left anterior descending (LAD) coronary artery (HCC)  Assessment & Plan  Admitted for chest and STEMI seen on EKG. Troponins were elevated.  Cardiology was consulted. The patient was brought to cath and a MERLENE was placed.  Plan to discharge with DAPT for 1 year. Will follow up with cardiology and cardiac rehab outpatient.   Nitro and morphine for chest pain  Metoprolol goal HR < 70  High dose statin goal LDL <65  Check lipid panel, HBA1c  Follow-up and cardiac echo      RENATA (acute kidney injury) (HCC)  Assessment & Plan  Likely prerenal  IV fluid hydration with normal saline  Monitor BMP and assess response  Avoid IV contrast/nephrotoxins/NSAIDs  Dose adjust meds for decreased GFR        Seizure (HCC)  Assessment & Plan  Cont home keppra    Acute respiratory failure with hypoxia (HCC)  Assessment & Plan  2 L of O2 above baseline  We will have to wean off oxygen as necessary    Hyperglycemia  Assessment & Plan  Follow-up on hemoglobin A1c      VTE prophylaxis: scd

## 2019-12-16 ENCOUNTER — APPOINTMENT (OUTPATIENT)
Dept: CARDIOLOGY | Facility: MEDICAL CENTER | Age: 84
DRG: 246 | End: 2019-12-16
Attending: INTERNAL MEDICINE
Payer: MEDICARE

## 2019-12-16 PROBLEM — I25.5 ISCHEMIC CARDIOMYOPATHY: Status: ACTIVE | Noted: 2019-12-16

## 2019-12-16 PROBLEM — J81.1 PULMONARY EDEMA: Status: ACTIVE | Noted: 2019-12-16

## 2019-12-16 LAB
ALBUMIN SERPL BCP-MCNC: 3.5 G/DL (ref 3.2–4.9)
ALBUMIN/GLOB SERPL: 1 G/DL
ALP SERPL-CCNC: 70 U/L (ref 30–99)
ALT SERPL-CCNC: 51 U/L (ref 2–50)
ANION GAP SERPL CALC-SCNC: 7 MMOL/L (ref 0–11.9)
AST SERPL-CCNC: 185 U/L (ref 12–45)
BILIRUB SERPL-MCNC: 0.6 MG/DL (ref 0.1–1.5)
BUN SERPL-MCNC: 16 MG/DL (ref 8–22)
CALCIUM SERPL-MCNC: 9 MG/DL (ref 8.5–10.5)
CHLORIDE SERPL-SCNC: 103 MMOL/L (ref 96–112)
CO2 SERPL-SCNC: 21 MMOL/L (ref 20–33)
CREAT SERPL-MCNC: 1.09 MG/DL (ref 0.5–1.4)
ERYTHROCYTE [DISTWIDTH] IN BLOOD BY AUTOMATED COUNT: 42.6 FL (ref 35.9–50)
GLOBULIN SER CALC-MCNC: 3.5 G/DL (ref 1.9–3.5)
GLUCOSE SERPL-MCNC: 117 MG/DL (ref 65–99)
HCT VFR BLD AUTO: 37.5 % (ref 42–52)
HGB BLD-MCNC: 12.7 G/DL (ref 14–18)
LV EJECT FRACT  99904: 30
LV EJECT FRACT MOD 2C 99903: 29.43
LV EJECT FRACT MOD 4C 99902: 40.55
LV EJECT FRACT MOD BP 99901: 36.4
MAGNESIUM SERPL-MCNC: 1.8 MG/DL (ref 1.5–2.5)
MCH RBC QN AUTO: 30.5 PG (ref 27–33)
MCHC RBC AUTO-ENTMCNC: 33.9 G/DL (ref 33.7–35.3)
MCV RBC AUTO: 90.1 FL (ref 81.4–97.8)
NT-PROBNP SERPL IA-MCNC: 4015 PG/ML (ref 0–125)
PHOSPHATE SERPL-MCNC: 2.8 MG/DL (ref 2.5–4.5)
PLATELET # BLD AUTO: 196 K/UL (ref 164–446)
PMV BLD AUTO: 8.9 FL (ref 9–12.9)
POTASSIUM SERPL-SCNC: 4.1 MMOL/L (ref 3.6–5.5)
PROT SERPL-MCNC: 7 G/DL (ref 6–8.2)
RBC # BLD AUTO: 4.16 M/UL (ref 4.7–6.1)
SODIUM SERPL-SCNC: 131 MMOL/L (ref 135–145)
TSH SERPL DL<=0.005 MIU/L-ACNC: 2.23 UIU/ML (ref 0.38–5.33)
WBC # BLD AUTO: 8.3 K/UL (ref 4.8–10.8)

## 2019-12-16 PROCEDURE — A9270 NON-COVERED ITEM OR SERVICE: HCPCS | Performed by: HOSPITALIST

## 2019-12-16 PROCEDURE — 93306 TTE W/DOPPLER COMPLETE: CPT

## 2019-12-16 PROCEDURE — 80053 COMPREHEN METABOLIC PANEL: CPT

## 2019-12-16 PROCEDURE — 97535 SELF CARE MNGMENT TRAINING: CPT

## 2019-12-16 PROCEDURE — 99232 SBSQ HOSP IP/OBS MODERATE 35: CPT | Performed by: INTERNAL MEDICINE

## 2019-12-16 PROCEDURE — 84100 ASSAY OF PHOSPHORUS: CPT

## 2019-12-16 PROCEDURE — 83880 ASSAY OF NATRIURETIC PEPTIDE: CPT

## 2019-12-16 PROCEDURE — A9270 NON-COVERED ITEM OR SERVICE: HCPCS | Performed by: INTERNAL MEDICINE

## 2019-12-16 PROCEDURE — 85027 COMPLETE CBC AUTOMATED: CPT

## 2019-12-16 PROCEDURE — 700117 HCHG RX CONTRAST REV CODE 255: Performed by: INTERNAL MEDICINE

## 2019-12-16 PROCEDURE — 700111 HCHG RX REV CODE 636 W/ 250 OVERRIDE (IP): Performed by: HOSPITALIST

## 2019-12-16 PROCEDURE — 700102 HCHG RX REV CODE 250 W/ 637 OVERRIDE(OP): Performed by: INTERNAL MEDICINE

## 2019-12-16 PROCEDURE — 99233 SBSQ HOSP IP/OBS HIGH 50: CPT | Performed by: HOSPITALIST

## 2019-12-16 PROCEDURE — 83735 ASSAY OF MAGNESIUM: CPT

## 2019-12-16 PROCEDURE — 93306 TTE W/DOPPLER COMPLETE: CPT | Mod: 26 | Performed by: INTERNAL MEDICINE

## 2019-12-16 PROCEDURE — 700102 HCHG RX REV CODE 250 W/ 637 OVERRIDE(OP): Performed by: HOSPITALIST

## 2019-12-16 PROCEDURE — 84443 ASSAY THYROID STIM HORMONE: CPT

## 2019-12-16 PROCEDURE — 770020 HCHG ROOM/CARE - TELE (206)

## 2019-12-16 RX ORDER — ACETAMINOPHEN 325 MG/1
650 TABLET ORAL EVERY 4 HOURS PRN
Status: DISCONTINUED | OUTPATIENT
Start: 2019-12-16 | End: 2019-12-17 | Stop reason: HOSPADM

## 2019-12-16 RX ADMIN — LEVETIRACETAM 500 MG: 500 TABLET ORAL at 05:07

## 2019-12-16 RX ADMIN — ASPIRIN 81 MG: 81 TABLET, COATED ORAL at 05:07

## 2019-12-16 RX ADMIN — ACETAMINOPHEN 650 MG: 325 TABLET, FILM COATED ORAL at 05:07

## 2019-12-16 RX ADMIN — HUMAN ALBUMIN MICROSPHERES AND PERFLUTREN 3 ML: 10; .22 INJECTION, SOLUTION INTRAVENOUS at 11:06

## 2019-12-16 RX ADMIN — SENNOSIDES AND DOCUSATE SODIUM 2 TABLET: 8.6; 5 TABLET ORAL at 05:08

## 2019-12-16 RX ADMIN — CLOPIDOGREL BISULFATE 75 MG: 75 TABLET ORAL at 05:07

## 2019-12-16 RX ADMIN — LEVETIRACETAM 500 MG: 500 TABLET ORAL at 17:58

## 2019-12-16 RX ADMIN — ENOXAPARIN SODIUM 40 MG: 100 INJECTION SUBCUTANEOUS at 05:08

## 2019-12-16 RX ADMIN — ATORVASTATIN CALCIUM 40 MG: 40 TABLET, FILM COATED ORAL at 20:11

## 2019-12-16 ASSESSMENT — ENCOUNTER SYMPTOMS
PALPITATIONS: 0
HEADACHES: 0
ABDOMINAL PAIN: 0
SHORTNESS OF BREATH: 0
DIZZINESS: 0
CHEST TIGHTNESS: 0
NERVOUS/ANXIOUS: 1
COUGH: 0
SHORTNESS OF BREATH: 1
BACK PAIN: 0
NEUROLOGICAL NEGATIVE: 1
FOCAL WEAKNESS: 0
POLYDIPSIA: 0
MUSCULOSKELETAL NEGATIVE: 1
EYES NEGATIVE: 1
NAUSEA: 0
GASTROINTESTINAL NEGATIVE: 1
NECK PAIN: 0
CARDIOVASCULAR NEGATIVE: 1
CONSTITUTIONAL NEGATIVE: 1
BRUISES/BLEEDS EASILY: 0
DEPRESSION: 0
FEVER: 0
HEARTBURN: 0
VOMITING: 0
WEAKNESS: 0
CHILLS: 0

## 2019-12-16 ASSESSMENT — COGNITIVE AND FUNCTIONAL STATUS - GENERAL
MOBILITY SCORE: 24
MOBILITY SCORE: 24
SUGGESTED CMS G CODE MODIFIER DAILY ACTIVITY: CH
SUGGESTED CMS G CODE MODIFIER MOBILITY: CH
SUGGESTED CMS G CODE MODIFIER MOBILITY: CH
DAILY ACTIVITIY SCORE: 24

## 2019-12-16 NOTE — PROGRESS NOTES
Cardiology Follow Up Progress Note    Date of Service  12/16/2019    Attending Physician  Manpreet Farley M.D.    Chief Complaint   Chest pain    HPI  Derek Frederick is a 89 y.o. male admitted 12/14/2019 with anterior STEMI, LAD stent, severe LV dysfunction with EF of 30%, marginal blood pressure and pulmonary edema.    Interim Events  12/16: No specific cardiac symptoms.  Tolerating medications.    Review of Systems  Review of Systems   Respiratory: Negative for chest tightness and shortness of breath.    Cardiovascular: Negative for chest pain and palpitations.   Gastrointestinal: Negative for abdominal pain and nausea.   Neurological: Negative for dizziness and headaches.       Vital signs in last 24 hours  Temp:  [35.8 °C (96.5 °F)-36.8 °C (98.3 °F)] 35.8 °C (96.5 °F)  Pulse:  [57-88] 63  Resp:  [17-32] 19  BP: ()/(51-80) 92/61  SpO2:  [90 %-98 %] 94 %    Physical Exam  Physical Exam  Constitutional:       General: He is not in acute distress.  HENT:      Head: Normocephalic.   Eyes:      General: No scleral icterus.  Neck:      Vascular: No carotid bruit.      Comments: Normal jugular venous pressure.  Cardiovascular:      Rate and Rhythm: Normal rate and regular rhythm.      Heart sounds: S1 normal and S2 normal. Murmur present. No friction rub. No gallop.    Pulmonary:      Effort: Pulmonary effort is normal.      Breath sounds: Normal breath sounds. No wheezing, rhonchi or rales.   Skin:     General: Skin is warm and dry.   Neurological:      Mental Status: He is alert and oriented to person, place, and time.   Psychiatric:         Behavior: Behavior normal.         Lab Review  Lab Results   Component Value Date/Time    WBC 8.3 12/16/2019 05:25 AM    RBC 4.16 (L) 12/16/2019 05:25 AM    HEMOGLOBIN 12.7 (L) 12/16/2019 05:25 AM    HEMATOCRIT 37.5 (L) 12/16/2019 05:25 AM    MCV 90.1 12/16/2019 05:25 AM    MCH 30.5 12/16/2019 05:25 AM    MCHC 33.9 12/16/2019 05:25 AM    MPV 8.9 (L) 12/16/2019 05:25 AM      Lab  Results   Component Value Date/Time    SODIUM 131 (L) 12/16/2019 05:25 AM    POTASSIUM 4.1 12/16/2019 05:25 AM    CHLORIDE 103 12/16/2019 05:25 AM    CO2 21 12/16/2019 05:25 AM    GLUCOSE 117 (H) 12/16/2019 05:25 AM    BUN 16 12/16/2019 05:25 AM    CREATININE 1.09 12/16/2019 05:25 AM      Lab Results   Component Value Date/Time    ASTSGOT 185 (H) 12/16/2019 05:25 AM    ALTSGPT 51 (H) 12/16/2019 05:25 AM     Lab Results   Component Value Date/Time    CHOLSTRLTOT 129 12/15/2019 04:37 AM    LDL 65 12/15/2019 04:37 AM    HDL 42 12/15/2019 04:37 AM    TRIGLYCERIDE 110 12/15/2019 04:37 AM    TROPONINT 51 (H) 12/14/2019 03:02 PM       Recent Labs     12/14/19  1502 12/16/19  0525   NTPROBNP 64 4015*       Cardiac Imaging and Procedures Review  Rhythm:  My personal interpretation of the EKG rhythm dated 12/16 is normal sinus rhythm    Echocardiogram: Pending    Cardiac Catheterization: 12/14/2019  1.  Multivessel coronary artery disease with high-grade ostial left anterior   descending artery, mid left anterior descending artery, proximal diagonal   branch stenosis and nonobstructive proximal right coronary artery stenosis.  2.  Successful percutaneous transluminal coronary angioplasty/stent placement   of the ostial left anterior descending artery with 2.5x12 mm Resolute   drug-eluting stent.  3.  Successful percutaneous transluminal coronary angioplasty/stent placement   of the mid left anterior descending artery with 2.5x22 mm Elliot drug-eluting   stent.  4.  Successful percutaneous transluminal coronary angioplasty/stent placement   of the proximal diagonal branch with 2.5x15 mm Resolute drug-eluting stent.  5.  Reduced left ventricular systolic function with ejection fraction of 30%,   anterior wall hypokinesis.  6.  Elevated left ventricular end-diastolic pressure.    Assessment/Plan  1.  Anterior STEMI.  2.  LAD and diagonal stents 12/14/2019.  3.  Left ventricular ejection fraction 30%.  4.  Acute systolic CHF  secondary to MI.  5.  Systolic murmur rule out significant valvular heart disease or VSD.  6.  Hypotension secondary to MI, LV dysfunction and valve disease.  7.  Seizure disorder.    Recommendation Discussion  1.  Low blood pressure precludes important elements of standard guideline directed therapy for MI, CAD, CHF including beta-blocker, diuretic therapy ACE or ARB therapy.  2.  Continue DAPT and statin therapy.  3.  Ambulate.  4.  Continue to monitor.  5.  Review echocardiogram.    Thank you for allowing me to participate in the care of this patient.    Please contact me with any questions.    Elijah Chery M.D.   Cardiologist, [facility]  [phone]

## 2019-12-16 NOTE — PROGRESS NOTES
Hospital Medicine Daily Progress Note    Date of Service  12/16/2019    Chief Complaint  89 y.o. male admitted 12/14/2019 with a history of seizure disorder on Keppra, no other major medical history reported, presenting to the emergency room after evaluation in outlying facility for chest pain, the patient presents with soft blood pressures and a heart rate of 62, anterior ST elevation diagnosed and rushed to the Cath Lab where the patient had 3 drug-eluting stents placed.  The patient transferred to his [facility] post intervention    Hospital Course    Anterior STEMI      Interval Problem Update  Patient seen and examined today. ICU Care  Care and plan discussed in IDT/Hot rounds.  Lines and assistive devices reviewed.    Patient tolerating treatment and therapies.  All Data, Medication data reviewed.  Case discussed with nursing as available.  Plan of Care reviewed with patient and notified of changes.  12/15 the patient feels better, no further chest pain, he is worried about his medication regimen, family at the bedside.  No significant dyspnea, remains on low-flow oxygen, bilateral pulmonary rales  12/16 the patient feels better, still has some exertional dyspnea, has not mobilized much, back-and-forth to the bathroom he can tolerate, patient wants to be discharged today, stressed the point that we have limited data available yet, he does have a significantly reduced ejection fraction and we are waiting echocardiogram, discussed with cardiology, he denies chest pain, no dyspnea no dizziness while resting, pulmonary exam slightly improved,  Consultants/Specialty  Cardiology    Code Status  Full code    Disposition  Cardiac intensive care    Review of Systems  Review of Systems   Constitutional: Negative.  Negative for chills and fever.   HENT: Negative.    Eyes: Negative.    Respiratory: Positive for shortness of breath. Negative for cough.    Cardiovascular: Negative.  Negative for chest pain and palpitations.    Gastrointestinal: Negative.  Negative for heartburn, nausea and vomiting.   Genitourinary: Negative.  Negative for dysuria and frequency.   Musculoskeletal: Negative.  Negative for back pain and neck pain.   Skin: Negative.  Negative for itching and rash.   Neurological: Negative.  Negative for dizziness, focal weakness, weakness and headaches.   Endo/Heme/Allergies: Negative.  Negative for polydipsia. Does not bruise/bleed easily.   Psychiatric/Behavioral: Negative for depression. The patient is nervous/anxious.         Physical Exam  Temp:  [35.8 °C (96.5 °F)-36.8 °C (98.3 °F)] 35.8 °C (96.5 °F)  Pulse:  [56-88] 63  Resp:  [17-32] 19  BP: ()/(51-80) 92/61  SpO2:  [90 %-98 %] 95 %    Physical Exam  Vitals signs and nursing note reviewed.   Constitutional:       Appearance: He is well-developed.      Comments: Pt seen and examined.   HENT:      Head: Normocephalic and atraumatic.   Eyes:      Pupils: Pupils are equal, round, and reactive to light.   Neck:      Musculoskeletal: Normal range of motion and neck supple.   Cardiovascular:      Rate and Rhythm: Normal rate.      Heart sounds: Murmur present.   Pulmonary:      Effort: Pulmonary effort is normal.      Breath sounds: Rales present.   Abdominal:      General: Bowel sounds are normal.      Palpations: Abdomen is soft.   Genitourinary:     Penis: Normal.    Musculoskeletal: Normal range of motion.   Skin:     General: Skin is warm and dry.   Neurological:      Mental Status: He is alert and oriented to person, place, and time.   Psychiatric:         Behavior: Behavior normal.         Fluids    Intake/Output Summary (Last 24 hours) at 12/16/2019 0815  Last data filed at 12/16/2019 0400  Gross per 24 hour   Intake 960.53 ml   Output 750 ml   Net 210.53 ml       Laboratory  Recent Labs     12/14/19  1502 12/15/19  0437 12/16/19  0525   WBC 10.6 7.6 8.3   RBC 4.62* 4.14* 4.16*   HEMOGLOBIN 13.9* 12.4* 12.7*   HEMATOCRIT 42.5 37.3* 37.5*   MCV 92.0 90.1 90.1    MCH 30.1 30.0 30.5   MCHC 32.7* 33.2* 33.9   RDW 43.6 42.6 42.6   PLATELETCT 237 209 196   MPV 8.6* 9.0 8.9*     Recent Labs     12/14/19  1502 12/15/19  0437 12/16/19  0525   SODIUM 138 134* 131*   POTASSIUM 4.0 4.1 4.1   CHLORIDE 107 106 103   CO2 21 19* 21   GLUCOSE 147* 116* 117*   BUN 17 18 16   CREATININE 1.20 1.16 1.09   CALCIUM 9.3 9.3 9.0     Recent Labs     12/14/19  1502   APTT 27.5   INR 1.08         Recent Labs     12/15/19  0437   TRIGLYCERIDE 110   HDL 42   LDL 65       Imaging  DX-CHEST-PORTABLE (1 VIEW)   Final Result         1.  Ill-defined pulmonary opacifications have decreased compared to prior radiograph. Findings could indicate decrease in pulmonary edema or inflammation.      2.  Linear opacifications in each lung base could be due to discoid atelectasis.      DX-CHEST-PORTABLE (1 VIEW)   Final Result         1.  Perihilar and interstitial opacifications are noted which could be due to edema or inflammation.      2.  No consolidations identified.      CL-LEFT HEART CATHETERIZATION WITH POSSIBLE INTERVENTION    (Results Pending)   EC-ECHOCARDIOGRAM COMPLETE W/O CONT    (Results Pending)        Assessment/Plan  * ST elevation myocardial infarction involving left anterior descending (LAD) coronary artery (HCC)  Assessment & Plan  Admitted for chest and STEMI seen on EKG. Troponins were elevated.   Cardiology was consulted.  Status post PCI, 3 MERLENE was placed.   Dual antiplatelet therapy, high intensity statin  Soft blood pressure and low heart rate not allowing for ACE elevation and beta-blockade currently  High dose statin goal LDL <65  Await echocardiogram, cardiac cath reported a reduction of EF to 30%      RENATA (acute kidney injury) (HCC)  Assessment & Plan  Possibly from transient hypotension, improved, stop fluids in light of low EF        Ischemic cardiomyopathy  Assessment & Plan  Secondary to his acute MI, EF per catheter 30%, await full echocardiogram to further assess  The patient  would likely have a prolonged recovery time where he needs close surveillance and observation    Pulmonary edema  Assessment & Plan  Secondary to LV compromise, await echocardiogram, consider diuresis once patient's blood pressure allows    LFT elevation  Assessment & Plan  Possibly from shock liver, low perfusion    Seizure (HCC)  Assessment & Plan  Cont home keppra    Acute respiratory failure with hypoxia (HCC)  Assessment & Plan  2 L of O2 above baseline  We will have to wean off oxygen as necessary    Hyperglycemia  Assessment & Plan  Follow-up on hemoglobin A1c     Plan  Stop IV fluids  Consider slight diuresis, follow-up chest x-ray  Check echocardiogram  Continue dual antiplatelet therapy  Continue statin  Currently blood pressure and low heart rate not allowing for beta-blockade or ACE inhibition, Aldactone  Close monitoring  The orders  Medically complex high risk  Discussed with cardiology  Consider oxygen and home support if indicated  VTE prophylaxis: Lovenox  I have performed a physical exam and reviewed and updated ROS and Plan today . In review of yesterday's note , there are no changes except as documented above.

## 2019-12-16 NOTE — ASSESSMENT & PLAN NOTE
Secondary to LV compromise, await echocardiogram, consider diuresis once patient's blood pressure allows

## 2019-12-16 NOTE — THERAPY
Physical Therapy: Pt is seen for education in phase I cardiac rehab s/p placement of stents x 3.  Pt is observed earlier walking 2 laps of the unit at a rapid pace with nsg behind. Pt reports that he is very active at baseline. Pt and wife were educated in phase I topics including exercise progression, talk test, RPE scale and phase II cardiac rehab was introduced. Pt and wife asking if [facility] offers this. Information given about the program. Pt is eager to continue exercise and will d/c home with assist from wife as needed. -Aleah Enrique, PT

## 2019-12-16 NOTE — ASSESSMENT & PLAN NOTE
Secondary to his acute MI, EF per catheter 30%, await full echocardiogram to further assess  The patient would likely have a prolonged recovery time where he needs close surveillance and observation

## 2019-12-16 NOTE — PROGRESS NOTES
Cardiovascular Nurse Navigator ([phone]) Note:    STEMI with PCI on 12/14/19 EF is 30% per cath report. There is no HF diagnosis at this time.    Reviewed ACS medications:  · DAPT: aspirin + clopidogrel  · Beta-Blocker:  per Dr. Apodaca's note yesterday, patinet is still too hypotensive.  · Statin:  atorva 40  · Consider for aldosterone blockade?  Not currently prescribed, will need to be addressed: prescribed or a provider note indicating why not prescribed, prior to discharge   · Consider for ACE-I/ARB/ARNI?  per Dr. Apodaca's note yesterday, patinet is still too hypotensive.    Meds to Beds BEDSIDE NURSING RESPONSIBILITIES:    Please initiate Meds to Beds for dual antiplatelet therapy:     1. Obtain outpatient order for P2Y12 inhibitor (ticagrelor, clopidogrel, prasugrel) from physician   2. Call [phone] (or, if after hours/weekend, [phone] and request 'on-call anti-coag pharmacist') patient should have med in hand at time of discharge.    Intensive Cardiac Rehab (ICR) Referral:  Referred on 12/14; has current inpatient orders for nutrition consult & PT for Phase I ICR    Demographics  Patient resides in: [place], [place]  Insurance: Medicare    Inpatient & Discharge Patient Education:  Bedside nursing to continually provide patient education on ACS meds, signs and symptoms to monitor for, and risk factor modification.     Also at discharge please complete the “Acute Coronary Syndrome” special instructions on the AVS.          Follow up care    [facility] . Asked that she arrange a 7 calendar day appointment within 7 calendar days of tomorrow in case HF is diagnosed.    Follow-up With  Details  Why  Contact Info   [facility]  Call in 1 week  Your physician has referred you to Intensive Cardiac Rehab. You cannot begin ICR for 2 weeks to allow time for your heart to heal. If you do not hear from ICR in about 1 week, please call them to schedule. Thank you!  [address]  [address]  [facility] [id number] [phone]       Thank you and please call with questions.

## 2019-12-17 ENCOUNTER — PATIENT OUTREACH (OUTPATIENT)
Dept: HEALTH INFORMATION MANAGEMENT | Facility: OTHER | Age: 84
End: 2019-12-17

## 2019-12-17 VITALS
OXYGEN SATURATION: 91 % | WEIGHT: 158.29 LBS | RESPIRATION RATE: 18 BRPM | TEMPERATURE: 97.7 F | DIASTOLIC BLOOD PRESSURE: 66 MMHG | BODY MASS INDEX: 23.99 KG/M2 | SYSTOLIC BLOOD PRESSURE: 106 MMHG | HEIGHT: 68 IN | HEART RATE: 78 BPM

## 2019-12-17 LAB
ALBUMIN SERPL BCP-MCNC: 3.6 G/DL (ref 3.2–4.9)
ALBUMIN/GLOB SERPL: 1.2 G/DL
ALP SERPL-CCNC: 71 U/L (ref 30–99)
ALT SERPL-CCNC: 42 U/L (ref 2–50)
ANION GAP SERPL CALC-SCNC: 10 MMOL/L (ref 0–11.9)
AST SERPL-CCNC: 119 U/L (ref 12–45)
BASOPHILS # BLD AUTO: 0.4 % (ref 0–1.8)
BASOPHILS # BLD: 0.03 K/UL (ref 0–0.12)
BILIRUB SERPL-MCNC: 0.8 MG/DL (ref 0.1–1.5)
BUN SERPL-MCNC: 12 MG/DL (ref 8–22)
CALCIUM SERPL-MCNC: 8.7 MG/DL (ref 8.5–10.5)
CHLORIDE SERPL-SCNC: 104 MMOL/L (ref 96–112)
CO2 SERPL-SCNC: 23 MMOL/L (ref 20–33)
CREAT SERPL-MCNC: 1.03 MG/DL (ref 0.5–1.4)
EOSINOPHIL # BLD AUTO: 0.06 K/UL (ref 0–0.51)
EOSINOPHIL NFR BLD: 0.7 % (ref 0–6.9)
ERYTHROCYTE [DISTWIDTH] IN BLOOD BY AUTOMATED COUNT: 42.8 FL (ref 35.9–50)
EST. AVERAGE GLUCOSE BLD GHB EST-MCNC: 111 MG/DL
GLOBULIN SER CALC-MCNC: 3.1 G/DL (ref 1.9–3.5)
GLUCOSE SERPL-MCNC: 116 MG/DL (ref 65–99)
HBA1C MFR BLD: 5.5 % (ref 0–5.6)
HCT VFR BLD AUTO: 36.3 % (ref 42–52)
HGB BLD-MCNC: 12.3 G/DL (ref 14–18)
IMM GRANULOCYTES # BLD AUTO: 0.04 K/UL (ref 0–0.11)
IMM GRANULOCYTES NFR BLD AUTO: 0.5 % (ref 0–0.9)
LYMPHOCYTES # BLD AUTO: 1.4 K/UL (ref 1–4.8)
LYMPHOCYTES NFR BLD: 16.9 % (ref 22–41)
MAGNESIUM SERPL-MCNC: 1.7 MG/DL (ref 1.5–2.5)
MCH RBC QN AUTO: 30.6 PG (ref 27–33)
MCHC RBC AUTO-ENTMCNC: 33.9 G/DL (ref 33.7–35.3)
MCV RBC AUTO: 90.3 FL (ref 81.4–97.8)
MONOCYTES # BLD AUTO: 1.07 K/UL (ref 0–0.85)
MONOCYTES NFR BLD AUTO: 12.9 % (ref 0–13.4)
NEUTROPHILS # BLD AUTO: 5.7 K/UL (ref 1.82–7.42)
NEUTROPHILS NFR BLD: 68.6 % (ref 44–72)
NRBC # BLD AUTO: 0 K/UL
NRBC BLD-RTO: 0 /100 WBC
NT-PROBNP SERPL IA-MCNC: 5419 PG/ML (ref 0–125)
PHOSPHATE SERPL-MCNC: 2.1 MG/DL (ref 2.5–4.5)
PLATELET # BLD AUTO: 192 K/UL (ref 164–446)
PMV BLD AUTO: 9.2 FL (ref 9–12.9)
POTASSIUM SERPL-SCNC: 4 MMOL/L (ref 3.6–5.5)
PROT SERPL-MCNC: 6.7 G/DL (ref 6–8.2)
RBC # BLD AUTO: 4.02 M/UL (ref 4.7–6.1)
SODIUM SERPL-SCNC: 137 MMOL/L (ref 135–145)
TROPONIN T SERPL-MCNC: 4506 NG/L (ref 6–19)
WBC # BLD AUTO: 8.3 K/UL (ref 4.8–10.8)

## 2019-12-17 PROCEDURE — 99232 SBSQ HOSP IP/OBS MODERATE 35: CPT | Performed by: INTERNAL MEDICINE

## 2019-12-17 PROCEDURE — 80053 COMPREHEN METABOLIC PANEL: CPT

## 2019-12-17 PROCEDURE — 700102 HCHG RX REV CODE 250 W/ 637 OVERRIDE(OP): Performed by: HOSPITALIST

## 2019-12-17 PROCEDURE — 84484 ASSAY OF TROPONIN QUANT: CPT

## 2019-12-17 PROCEDURE — 99239 HOSP IP/OBS DSCHRG MGMT >30: CPT | Performed by: HOSPITALIST

## 2019-12-17 PROCEDURE — 84100 ASSAY OF PHOSPHORUS: CPT

## 2019-12-17 PROCEDURE — A9270 NON-COVERED ITEM OR SERVICE: HCPCS | Performed by: INTERNAL MEDICINE

## 2019-12-17 PROCEDURE — 85025 COMPLETE CBC W/AUTO DIFF WBC: CPT

## 2019-12-17 PROCEDURE — 700102 HCHG RX REV CODE 250 W/ 637 OVERRIDE(OP): Performed by: INTERNAL MEDICINE

## 2019-12-17 PROCEDURE — 700111 HCHG RX REV CODE 636 W/ 250 OVERRIDE (IP): Performed by: HOSPITALIST

## 2019-12-17 PROCEDURE — 83880 ASSAY OF NATRIURETIC PEPTIDE: CPT

## 2019-12-17 PROCEDURE — 83735 ASSAY OF MAGNESIUM: CPT

## 2019-12-17 PROCEDURE — 36415 COLL VENOUS BLD VENIPUNCTURE: CPT

## 2019-12-17 PROCEDURE — A9270 NON-COVERED ITEM OR SERVICE: HCPCS | Performed by: HOSPITALIST

## 2019-12-17 RX ORDER — ASPIRIN 81 MG/1
81 TABLET ORAL DAILY
Qty: 30 TAB | Refills: 11 | Status: SHIPPED | OUTPATIENT
Start: 2019-12-18 | End: 2019-12-23

## 2019-12-17 RX ORDER — ATORVASTATIN CALCIUM 40 MG/1
40 TABLET, FILM COATED ORAL
Qty: 30 TAB | Refills: 11 | Status: SHIPPED | OUTPATIENT
Start: 2019-12-17 | End: 2019-12-23

## 2019-12-17 RX ORDER — CLOPIDOGREL BISULFATE 75 MG/1
75 TABLET ORAL DAILY
Qty: 30 TAB | Refills: 11 | Status: SHIPPED | OUTPATIENT
Start: 2019-12-18 | End: 2019-12-23

## 2019-12-17 RX ORDER — METOPROLOL SUCCINATE 25 MG/1
25 TABLET, EXTENDED RELEASE ORAL DAILY
Qty: 30 TAB | Refills: 11 | Status: SHIPPED | OUTPATIENT
Start: 2019-12-17 | End: 2019-12-23

## 2019-12-17 RX ORDER — CLOPIDOGREL BISULFATE 75 MG/1
75 TABLET ORAL DAILY
Qty: 30 TAB | Refills: 0 | Status: SHIPPED
Start: 2019-12-17 | End: 2019-12-17

## 2019-12-17 RX ADMIN — ASPIRIN 81 MG: 81 TABLET, COATED ORAL at 05:32

## 2019-12-17 RX ADMIN — LEVETIRACETAM 500 MG: 500 TABLET ORAL at 05:32

## 2019-12-17 RX ADMIN — ENOXAPARIN SODIUM 40 MG: 100 INJECTION SUBCUTANEOUS at 05:32

## 2019-12-17 RX ADMIN — CLOPIDOGREL BISULFATE 75 MG: 75 TABLET ORAL at 05:32

## 2019-12-17 ASSESSMENT — ENCOUNTER SYMPTOMS
SHORTNESS OF BREATH: 0
HEADACHES: 0
NAUSEA: 0
PALPITATIONS: 0
CHEST TIGHTNESS: 0
ABDOMINAL PAIN: 0
DIZZINESS: 0

## 2019-12-17 NOTE — DISCHARGE PLANNING
Voicemail message left with schedulers at [facility] to phone pt in order to schedule appt. The message states they are open on Mondays, Wednesdays and Fridays from [phone] only. Contact information for pt left on voicemail message. This writer gave pt contact information for [facility] also. Bedside nurse notified.

## 2019-12-17 NOTE — DISCHARGE PLANNING
MEDS TO BEDS     DELIVERED TO PATIENT IN ROOM    Clopidogrel 75mg #30 provided at $15.52 copay.     Pt education provided, all questions answered, including but not limited to potential side effects, what to do if a dose is missed, when to contact his physician.  Pt verbalized understanding to take this medication daily along with an 81mg ASA, with or without regard to food,  for a duration of 12 months unless otherwise directed by cardiology.  Pt understands to begin this medication AFTER discharge from hospital. Pt verbalized understanding that he must refill this medication at his local drug outlet.     All questions answered.  Pt verbalized understanding including when to return to the ED.  Obed Schultz, PharmD

## 2019-12-17 NOTE — PROGRESS NOTES
Patient A&Ox4 with wife at bedside. Given d/c instructions, prescriptions, and f/u appointment information. Plavix delivered to bed per pharmacy. All questions answered per patient and wife. Signature obtained. Left ambulatory in NAD with RN. All belongings retained per patient and wife.

## 2019-12-17 NOTE — PROGRESS NOTES
Cardiology Follow Up Progress Note    Date of Service  12/17/2019    Attending Physician  Manpreet Farley M.D.    Chief Complaint   Chest pain    HPI  Derek Frederick is a 89 y.o. male admitted 12/14/2019 with anterior STEMI, LAD stent, severe LV dysfunction with EF of 30%, marginal blood pressure and pulmonary edema.    Interim Events  12/16: No specific cardiac symptoms.  Tolerating medications.  12/17: No specific cardiac problems.  Ambulating with no symptoms specifically no shortness of breath.  Blood pressure has significantly improved.    Review of Systems  Review of Systems   Respiratory: Negative for chest tightness and shortness of breath.    Cardiovascular: Negative for chest pain and palpitations.   Gastrointestinal: Negative for abdominal pain and nausea.   Neurological: Negative for dizziness and headaches.       Vital signs in last 24 hours  Temp:  [36.2 °C (97.2 °F)-37.3 °C (99.2 °F)] 36.5 °C (97.7 °F)  Pulse:  [69-78] 78  Resp:  [16-18] 18  BP: (105-118)/(57-68) 106/66  SpO2:  [90 %-95 %] 91 %    Physical Exam  Physical Exam  Constitutional:       General: He is not in acute distress.  HENT:      Head: Normocephalic.   Eyes:      General: No scleral icterus.  Neck:      Vascular: No carotid bruit.      Comments: Normal jugular venous pressure.  Cardiovascular:      Rate and Rhythm: Normal rate and regular rhythm.      Heart sounds: S1 normal and S2 normal. Murmur present. No friction rub. No gallop.    Pulmonary:      Effort: Pulmonary effort is normal.      Breath sounds: Normal breath sounds. No wheezing, rhonchi or rales.   Skin:     General: Skin is warm and dry.   Neurological:      Mental Status: He is alert and oriented to person, place, and time.   Psychiatric:         Behavior: Behavior normal.         Lab Review  Lab Results   Component Value Date/Time    WBC 8.3 12/17/2019 03:00 AM    RBC 4.02 (L) 12/17/2019 03:00 AM    HEMOGLOBIN 12.3 (L) 12/17/2019 03:00 AM    HEMATOCRIT 36.3 (L) 12/17/2019  03:00 AM    MCV 90.3 12/17/2019 03:00 AM    MCH 30.6 12/17/2019 03:00 AM    MCHC 33.9 12/17/2019 03:00 AM    MPV 9.2 12/17/2019 03:00 AM      Lab Results   Component Value Date/Time    SODIUM 137 12/17/2019 03:00 AM    POTASSIUM 4.0 12/17/2019 03:00 AM    CHLORIDE 104 12/17/2019 03:00 AM    CO2 23 12/17/2019 03:00 AM    GLUCOSE 116 (H) 12/17/2019 03:00 AM    BUN 12 12/17/2019 03:00 AM    CREATININE 1.03 12/17/2019 03:00 AM      Lab Results   Component Value Date/Time    ASTSGOT 119 (H) 12/17/2019 03:00 AM    ALTSGPT 42 12/17/2019 03:00 AM     Lab Results   Component Value Date/Time    CHOLSTRLTOT 129 12/15/2019 04:37 AM    LDL 65 12/15/2019 04:37 AM    HDL 42 12/15/2019 04:37 AM    TRIGLYCERIDE 110 12/15/2019 04:37 AM    TROPONINT 4506 (H) 12/17/2019 09:09 AM       Recent Labs     12/14/19  1502 12/16/19  0525 12/17/19  0300   NTPROBNP 64 4015* 5419*       Cardiac Imaging and Procedures Review  Rhythm:  My personal interpretation of the EKG rhythm dated 12/16 is normal sinus rhythm    Echocardiogram: 12/16/2019  No prior study is available for comparison.   Severely reduced left ventricular systolic function. Left ventricular   ejection fraction is visually estimated to be 30%.  Moderate aortic regurgitation.  No evidence of LV thrombus.  Dilated inferior vena cava without inspiratory collapse.  Right ventricular systolic pressure is estimated to be 60 mmHg.    Cardiac Catheterization: 12/14/2019  1.  Multivessel coronary artery disease with high-grade ostial left anterior   descending artery, mid left anterior descending artery, proximal diagonal   branch stenosis and nonobstructive proximal right coronary artery stenosis.  2.  Successful percutaneous transluminal coronary angioplasty/stent placement   of the ostial left anterior descending artery with 2.5x12 mm Resolute   drug-eluting stent.  3.  Successful percutaneous transluminal coronary angioplasty/stent placement   of the mid left anterior descending artery  with 2.5x22 mm [place] drug-eluting   stent.  4.  Successful percutaneous transluminal coronary angioplasty/stent placement   of the proximal diagonal branch with 2.5x15 mm Resolute drug-eluting stent.  5.  Reduced left ventricular systolic function with ejection fraction of 30%,   anterior wall hypokinesis.  6.  Elevated left ventricular end-diastolic pressure.    Assessment/Plan  1.  Anterior STEMI.  2.  LAD and diagonal stents 12/14/2019.  3.  Left ventricular ejection fraction 30%.  4.  Acute systolic CHF secondary to MI.  5.  Moderate aortic regurgitation.  6.  Hypotension secondary to MI, LV dysfunction and valve disease.  7.  Seizure disorder.  8.  Moderate pulmonary hypertension.    Recommendation Discussion  1.  Cardiac status is stable with progressive clinical improvement no active heart failure or arrhythmias.  2.  Continue DAPT and statin therapy.  3.  With blood pressure improvement would agree with adding low-dose metoprolol.  4.  Refer to [facility] cardiac rehab program.  5.  Cardiology clinic appointment 5 to 7 days.  6.  Long-term patient considering moving back to [place] in the [facility].  7.  Will sign off.    Thank you for allowing me to participate in the care of this patient.    Please contact me with any questions.    Elijah Chery M.D.   Cardiologist, [facility]  [phone]

## 2019-12-17 NOTE — CARE PLAN

## 2019-12-17 NOTE — CARE PLAN
Problem: Respiratory:  Goal: Respiratory status will improve  Note:   Weaned from 4L to room air.     Problem: Pain Management  Goal: Pain level will decrease to patient's comfort goal  Note:   Denies chest pain.

## 2019-12-17 NOTE — PROGRESS NOTES
2 RN Skin Check    2 RN skin check complete.     Skin CDI. Red and blanching elbows.  Rt radial cath site with dressing CDI

## 2019-12-17 NOTE — DISCHARGE INSTRUCTIONS
Metoprolol tablets  What is this medicine?  METOPROLOL (me TOE proriley lole) is a beta-blocker. Beta-blockers reduce the workload on the heart and help it to beat more regularly. This medicine is used to treat high blood pressure and to prevent chest pain. It is also used to after a heart attack and to prevent an additional heart attack from occurring.  This medicine may be used for other purposes; ask your health care provider or pharmacist if you have questions.  COMMON BRAND NAME(S): Lopressor  What should I tell my health care provider before I take this medicine?  They need to know if you have any of these conditions:  -diabetes  -heart or vessel disease like slow heart rate, worsening heart failure, heart block, sick sinus syndrome or Raynaud's disease  -kidney disease  -liver disease  -lung or breathing disease, like asthma or emphysema  -pheochromocytoma  -thyroid disease  -an unusual or allergic reaction to metoprolol, other beta-blockers, medicines, foods, dyes, or preservatives  -pregnant or trying to get pregnant  -breast-feeding  How should I use this medicine?  Take this medicine by mouth with a drink of water. Follow the directions on the prescription label. Take this medicine immediately after meals. Take your doses at regular intervals. Do not take more medicine than directed. Do not stop taking this medicine suddenly. This could lead to serious heart-related effects.  Talk to your pediatrician regarding the use of this medicine in children. Special care may be needed.  Overdosage: If you think you have taken too much of this medicine contact a poison control center or emergency room at once.  NOTE: This medicine is only for you. Do not share this medicine with others.  What if I miss a dose?  If you miss a dose, take it as soon as you can. If it is almost time for your next dose, take only that dose. Do not take double or extra doses.  What may interact with this medicine?  This medicine may interact  with the following medications:  -certain medicines for blood pressure, heart disease, irregular heart beat  -certain medicines for depression like monoamine oxidase (MAO) inhibitors, fluoxetine, or paroxetine  -clonidine  -dobutamine  -epinephrine  -isoproterenol  -reserpine  This list may not describe all possible interactions. Give your health care provider a list of all the medicines, herbs, non-prescription drugs, or dietary supplements you use. Also tell them if you smoke, drink alcohol, or use illegal drugs. Some items may interact with your medicine.  What should I watch for while using this medicine?  Visit your doctor or health care professional for regular check ups. Contact your doctor right away if your symptoms worsen. Check your blood pressure and pulse rate regularly. Ask your health care professional what your blood pressure and pulse rate should be, and when you should contact them.  You may get drowsy or dizzy. Do not drive, use machinery, or do anything that needs mental alertness until you know how this medicine affects you. Do not sit or stand up quickly, especially if you are an older patient. This reduces the risk of dizzy or fainting spells. Contact your doctor if these symptoms continue. Alcohol may interfere with the effect of this medicine. Avoid alcoholic drinks.  What side effects may I notice from receiving this medicine?  Side effects that you should report to your doctor or health care professional as soon as possible:  -allergic reactions like skin rash, itching or hives  -cold or numb hands or feet  -depression  -difficulty breathing  -faint  -fever with sore throat  -irregular heartbeat, chest pain  -rapid weight gain  -swollen legs or ankles  Side effects that usually do not require medical attention (report to your doctor or health care professional if they continue or are bothersome):  -anxiety or nervousness  -change in sex drive or performance  -dry  skin  -headache  -nightmares or trouble sleeping  -short term memory loss  -stomach upset or diarrhea  -unusually tired  This list may not describe all possible side effects. Call your doctor for medical advice about side effects. You may report side effects to FDA at 0-868-GBO-3999.  Where should I keep my medicine?  Keep out of the reach of children.  Store at room temperature between 15 and 30 degrees C (59 and 86 degrees F). Throw away any unused medicine after the expiration date.  NOTE: This sheet is a summary. It may not cover all possible information. If you have questions about this medicine, talk to your doctor, pharmacist, or health care provider.  © 2018 [email or web address] Standard (2014-08-22 14:40:36)  Clopidogrel tablets  What is this medicine?  CLOPIDOGREL (kloh PID oh grel) helps to prevent blood clots. This medicine is used to prevent heart attack, stroke, or other vascular events in people who are at high risk.  This medicine may be used for other purposes; ask your health care provider or pharmacist if you have questions.  COMMON BRAND NAME(S): Plavix  What should I tell my health care provider before I take this medicine?  They need to know if you have any of the following conditions:  -bleeding disorders  -bleeding in the brain  -having surgery  -history of stomach bleeding  -an unusual or allergic reaction to clopidogrel, other medicines, foods, dyes, or preservatives  -pregnant or trying to get pregnant  -breast-feeding  How should I use this medicine?  Take this medicine by mouth with a glass of water. Follow the directions on the prescription label. You may take this medicine with or without food. If it upsets your stomach, take it with food. Take your medicine at regular intervals. Do not take it more often than directed. Do not stop taking except on your doctor's advice.  A special MedGuide will be given to you by the pharmacist with each prescription and refill. Be sure to read this  information carefully each time.  Talk to your pediatrician regarding the use of this medicine in children. Special care may be needed.  Overdosage: If you think you have taken too much of this medicine contact a poison control center or emergency room at once.  NOTE: This medicine is only for you. Do not share this medicine with others.  What if I miss a dose?  If you miss a dose, take it as soon as you can. If it is almost time for your next dose, take only that dose. Do not take double or extra doses.  What may interact with this medicine?  Do not take this medicine with the following medications:  -dasabuvir; ombitasvir; paritaprevir; ritonavir  -defibrotide  This medicine may also interact with the following medications:  -antiviral medicines for HIV or AIDS  -aspirin  -certain medicines for depression like citalopram, fluoxetine, fluvoxamine  -certain medicines for fungal infections like ketoconazole, fluconazole, voriconazole  -certain medicines for seizures like felbamate, oxcarbazepine, phenytoin  -certain medicines for stomach problems like cimetidine, omeprazole, esomeprazole  -certain medicines that treat or prevent blood clots like warfarin, enoxaparin, dalteparin, apixaban, dabigatran, rivaroxaban, ticlopidine  -chloramphenicol  -cilostazol  -fluvastatin  -isoniazid  -modafinil  -nicardipine  -NSAIDS, medicines for pain and inflammation, like ibuprofen or naproxen  -quinine  -repaglinide  -tamoxifen  -tolbutamide  -topiramate  -torsemide  This list may not describe all possible interactions. Give your health care provider a list of all the medicines, herbs, non-prescription drugs, or dietary supplements you use. Also tell them if you smoke, drink alcohol, or use illegal drugs. Some items may interact with your medicine.  What should I watch for while using this medicine?  Visit your doctor or health care professional for regular check ups. Do not stop taking your medicine unless your doctor tells you  to.  Notify your doctor or health care professional and seek emergency treatment if you develop breathing problems; changes in vision; chest pain; severe, sudden headache; pain, swelling, warmth in the leg; trouble speaking; sudden numbness or weakness of the face, arm or leg. These can be signs that your condition has gotten worse.  If you are going to have surgery or dental work, tell your doctor or health care professional that you are taking this medicine.  Certain genetic factors may reduce the effect of this medicine. Your doctor may use genetic tests to determine treatment.  What side effects may I notice from receiving this medicine?  Side effects that you should report to your doctor or health care professional as soon as possible:  -allergic reactions like skin rash, itching or hives, swelling of the face, lips, or tongue  -signs and symptoms of bleeding such as bloody or black, tarry stools; red or dark-brown urine; spitting up blood or brown material that looks like coffee grounds; red spots on the skin; unusual bruising or bleeding from the eye, gums, or nose  -signs and symptoms of a blood clot such as breathing problems; changes in vision; chest pain; severe, sudden headache; pain, swelling, warmth in the leg; trouble speaking; sudden numbness or weakness of the face, arm or leg  Side effects that usually do not require medical attention (report to your doctor or health care professional if they continue or are bothersome):  -constipation  -diarrhea  -headache  -upset stomach  This list may not describe all possible side effects. Call your doctor for medical advice about side effects. You may report side effects to FDA at 2-464-FDA-5175.  Where should I keep my medicine?  Keep out of the reach of children.  Store at room temperature of 59 to 86 degrees F (15 to 30 degrees C). Throw away any unused medicine after the expiration date.  NOTE: This sheet is a summary. It may not cover all possible  information. If you have questions about this medicine, talk to your doctor, pharmacist, or health care provider.  © 2018 [employer]/Gold Standard (2016-09-22 10:00:44)  Atorvastatin tablets  What is this medicine?  ATORVASTATIN (a TORE va sta tin) is known as a HMG-CoA reductase inhibitor or 'statin'. It lowers the level of cholesterol and triglycerides in the blood. This drug may also reduce the risk of heart attack, stroke, or other health problems in patients with risk factors for heart disease. Diet and lifestyle changes are often used with this drug.  This medicine may be used for other purposes; ask your health care provider or pharmacist if you have questions.  COMMON BRAND NAME(S): Lipitor  What should I tell my health care provider before I take this medicine?  They need to know if you have any of these conditions:  -frequently drink alcoholic beverages  -history of stroke, TIA  -kidney disease  -liver disease  -muscle aches or weakness  -other medical condition  -an unusual or allergic reaction to atorvastatin, other medicines, foods, dyes, or preservatives  -pregnant or trying to get pregnant  -breast-feeding  How should I use this medicine?  Take this medicine by mouth with a glass of water. Follow the directions on the prescription label. You can take this medicine with or without food. Take your doses at regular intervals. Do not take your medicine more often than directed.  Talk to your pediatrician regarding the use of this medicine in children. While this drug may be prescribed for children as young as 10 years old for selected conditions, precautions do apply.  Overdosage: If you think you have taken too much of this medicine contact a poison control center or emergency room at once.  NOTE: This medicine is only for you. Do not share this medicine with others.  What if I miss a dose?  If you miss a dose, take it as soon as you can. If it is almost time for your next dose, take only that dose. Do not  take double or extra doses.  What may interact with this medicine?  Do not take this medicine with any of the following medications:  -red yeast rice  -telaprevir  -telithromycin  -voriconazole  This medicine may also interact with the following medications:  -alcohol  -antiviral medicines for HIV or AIDS  -boceprevir  -certain antibiotics like clarithromycin, erythromycin, troleandomycin  -certain medicines for cholesterol like fenofibrate or gemfibrozil  -cimetidine  -clarithromycin  -colchicine  -cyclosporine  -digoxin  -female hormones, like estrogens or progestins and birth control pills  -grapefruit juice  -medicines for fungal infections like fluconazole, itraconazole, ketoconazole  -niacin  -rifampin  -spironolactone  This list may not describe all possible interactions. Give your health care provider a list of all the medicines, herbs, non-prescription drugs, or dietary supplements you use. Also tell them if you smoke, drink alcohol, or use illegal drugs. Some items may interact with your medicine.  What should I watch for while using this medicine?  Visit your doctor or health care professional for regular check-ups. You may need regular tests to make sure your liver is working properly.  Tell your doctor or health care professional right away if you get any unexplained muscle pain, tenderness, or weakness, especially if you also have a fever and tiredness. Your doctor or health care professional may tell you to stop taking this medicine if you develop muscle problems. If your muscle problems do not go away after stopping this medicine, contact your health care professional.  This drug is only part of a total heart-health program. Your doctor or a dietician can suggest a low-cholesterol and low-fat diet to help. Avoid alcohol and smoking, and keep a proper exercise schedule.  Do not use this drug if you are pregnant or breast-feeding. Serious side effects to an unborn child or to an infant are possible.  Talk to your doctor or pharmacist for more information.  This medicine may affect blood sugar levels. If you have diabetes, check with your doctor or health care professional before you change your diet or the dose of your diabetic medicine.  If you are going to have surgery tell your health care professional that you are taking this drug.  What side effects may I notice from receiving this medicine?  Side effects that you should report to your doctor or health care professional as soon as possible:  -allergic reactions like skin rash, itching or hives, swelling of the face, lips, or tongue  -dark urine  -fever  -joint pain  -muscle cramps, pain  -redness, blistering, peeling or loosening of the skin, including inside the mouth  -trouble passing urine or change in the amount of urine  -unusually weak or tired  -yellowing of eyes or skin  Side effects that usually do not require medical attention (report to your doctor or health care professional if they continue or are bothersome):  -constipation  -heartburn  -stomach gas, pain, upset  This list may not describe all possible side effects. Call your doctor for medical advice about side effects. You may report side effects to FDA at 9-723-FDA-0113.  Where should I keep my medicine?  Keep out of the reach of children.  Store at room temperature between 20 to 25 degrees C (68 to 77 degrees F). Throw away any unused medicine after the expiration date.  NOTE: This sheet is a summary. It may not cover all possible information. If you have questions about this medicine, talk to your doctor, pharmacist, or health care provider.  © 2018 [employer]/Gold Standard (2012-11-06 09:18:24)  Aspirin, ASA oral tablets  What is this medicine?  ASPIRIN (AS pir in) is a pain reliever. It is used to treat mild pain and fever. This medicine is also used as directed by a doctor to prevent and to treat heart attacks, to prevent strokes, and to treat arthritis or inflammation.  This medicine  may be used for other purposes; ask your health care provider or pharmacist if you have questions.  COMMON BRAND NAME(S): Aspir-Low, Aspir-Kika, Aspirtab, [place] Advanced Aspirin, [place] Aspirin, [place] Aspirin Extra Strength, [place] Aspirin Plus, [place] Extra Strength, [place] Extra Strength Plus, [place] Genuine Aspirin, [place] Womens Aspirin, Bufferin, Bufferin Extra Strength, Bufferin Low Dose  What should I tell my health care provider before I take this medicine?  They need to know if you have any of these conditions:  -anemia  -asthma  -bleeding problems  -child with chickenpox, the flu, or other viral infection  -diabetes  -gout  -if you frequently drink alcohol containing drinks  -kidney disease  -liver disease  -low level of vitamin K  -lupus  -smoke tobacco  -stomach ulcers or other problems  -an unusual or allergic reaction to aspirin, tartrazine dye, other medicines, dyes, or preservatives  -pregnant or trying to get pregnant  -breast-feeding  How should I use this medicine?  Take this medicine by mouth with a glass of water. Follow the directions on the package or prescription label. You can take this medicine with or without food. If it upsets your stomach, take it with food. Do not take your medicine more often than directed.  Talk to your pediatrician regarding the use of this medicine in children. While this drug may be prescribed for children as young as 12 years of age for selected conditions, precautions do apply. Children and teenagers should not use this medicine to treat chicken pox or flu symptoms unless directed by a doctor.  Patients over 65 years old may have a stronger reaction and need a smaller dose.  Overdosage: If you think you have taken too much of this medicine contact a poison control center or emergency room at once.  NOTE: This medicine is only for you. Do not share this medicine with others.  What if I miss a dose?  If you are taking this medicine on a regular schedule and miss a dose,  take it as soon as you can. If it is almost time for your next dose, take only that dose. Do not take double or extra doses.  What may interact with this medicine?  Do not take this medicine with any of the following medications:  -cidofovir  -ketorolac  -probenecid  This medicine may also interact with the following medications:  -alcohol  -alendronate  -bismuth subsalicylate  -flavocoxid  -herbal supplements like feverfew, garlic, agnes, ginkgo biloba, horse chestnut  -medicines for diabetes or glaucoma like acetazolamide, methazolamide  -medicines for gout  -medicines that treat or prevent blood clots like enoxaparin, heparin, ticlopidine, warfarin  -other aspirin and aspirin-like medicines  -NSAIDs, medicines for pain and inflammation, like ibuprofen or naproxen  -pemetrexed  -sulfinpyrazone  -varicella live vaccine  This list may not describe all possible interactions. Give your health care provider a list of all the medicines, herbs, non-prescription drugs, or dietary supplements you use. Also tell them if you smoke, drink alcohol, or use illegal drugs. Some items may interact with your medicine.  What should I watch for while using this medicine?  If you are treating yourself for pain, tell your doctor or health care professional if the pain lasts more than 10 days, if it gets worse, or if there is a new or different kind of pain. Tell your doctor if you see redness or swelling. Also, check with your doctor if you have a fever that lasts for more than 3 days. Only take this medicine to prevent heart attacks or blood clotting if prescribed by your doctor or health care professional.  Do not take aspirin or aspirin-like medicines with this medicine. Too much aspirin can be dangerous. Always read the labels carefully.  This medicine can irritate your stomach or cause bleeding problems. Do not smoke cigarettes or drink alcohol while taking this medicine. Do not lie down for 30 minutes after taking this medicine  to prevent irritation to your throat.  If you are scheduled for any medical or dental procedure, tell your healthcare provider that you are taking this medicine. You may need to stop taking this medicine before the procedure.  This medicine may be used to treat migraines. If you take migraine medicines for 10 or more days a month, your migraines may get worse. Keep a diary of headache days and medicine use. Contact your healthcare professional if your migraine attacks occur more frequently.  What side effects may I notice from receiving this medicine?  Side effects that you should report to your doctor or health care professional as soon as possible:  -allergic reactions like skin rash, itching or hives, swelling of the face, lips, or tongue  -breathing problems  -changes in hearing, ringing in the ears  -confusion  -general ill feeling or flu-like symptoms  -pain on swallowing  -redness, blistering, peeling or loosening of the skin, including inside the mouth or nose  -signs and symptoms of bleeding such as bloody or black, tarry stools; red or dark-brown urine; spitting up blood or brown material that looks like coffee grounds; red spots on the skin; unusual bruising or bleeding from the eye, gums, or nose  -trouble passing urine or change in the amount of urine  -unusually weak or tired  -yellowing of the eyes or skin  Side effects that usually do not require medical attention (report to your doctor or health care professional if they continue or are bothersome):  -diarrhea or constipation  -headache  -nausea, vomiting  -stomach gas, heartburn  This list may not describe all possible side effects. Call your doctor for medical advice about side effects. You may report side effects to FDA at 6-182-FDA-6363.  Where should I keep my medicine?  Keep out of the reach of children.  Store at room temperature between 15 and 30 degrees C (59 and 86 degrees F). Protect from heat and moisture. Do not use this medicine if it  has a strong vinegar smell. Throw away any unused medicine after the expiration date.  NOTE: This sheet is a summary. It may not cover all possible information. If you have questions about this medicine, talk to your doctor, pharmacist, or health care provider.  © 2018 [employer]/Gold Standard (2014-08-19 11:30:31)  Discharge Instructions    Discharged to home by car with relative. Discharged via wheelchair, hospital escort: Yes.  Special equipment needed: Not Applicable    Be sure to schedule a follow-up appointment with your primary care doctor or any specialists as instructed.     Discharge Plan:   Influenza Vaccine Indication: Not indicated: Previously immunized this influenza season and > 8 years of age    I understand that a diet low in cholesterol, fat, and sodium is recommended for good health. Unless I have been given specific instructions below for another diet, I accept this instruction as my diet prescription.   Other diet: Regular Cardiac    Special Instructions: Diagnosis:  Acute Coronary Syndrome (ACS) is a diagnosis that encompasses cardiac-related chest pain and heart attack. ACS occurs when the blood flow to the heart muscle is severely reduced or cut off completely due to a slow process called atherosclerosis.  Atherosclerosis is a disease in which the coronary arteries become narrow from a buildup of fat, cholesterol, and other substances that combine to form plaque. If the plaque breaks, a blood clot will form and block the blood flow to the heart muscle. This lack of blood flow can cause damage or death to the heart muscle which is called a heart attack or Myocardial Infarction (MI). There are two different types of MIs:  ST Elevation Myocardial Infarction or STEMI (the most severe type of heart attack) and Non-ST Elevation Myocardial Infarction or NSTEMI.    Treatment Plan:  · Cardiac Diet  - Low fat, low salt, low cholesterol   · Cardiac Rehab  - Your doctor has ordered you a referral to [facility].  Call [phone] to schedule an appointment.  · Attend my follow-up appointment with my Cardiologist.  · Take my medications as prescribed by my doctor  · Exercise daily  · Lower my bad cholesterol and raise my good cholesterol, lower my blood pressure and Reduce stress    Medications:  Certain medications are used to treat ACS.  Remember to always take medications as prescribed and never stop talking medications unless told by your doctor.    You have been prescribed the following medicatons:    Aspirin - Aspirin is used as a blood thinning medication and you will require this medication indefinitely.  Beta-Blocker - Beta-Blocker metoprolol is used to lower blood pressure and heart rate, and/or helps your heart heal after a heart attack.  Statin - Statin atorvastatin is used to lower cholesterol.  Plavix - Antiplatelet    · Is patient discharged on Warfarin / Coumadin?   No     Depression / Suicide Risk    As you are discharged from this [facility] facility, it is important to learn how to keep safe from harming yourself.    Recognize the warning signs:  · Abrupt changes in personality, positive or negative- including increase in energy   · Giving away possessions  · Change in eating patterns- significant weight changes-  positive or negative  · Change in sleeping patterns- unable to sleep or sleeping all the time   · Unwillingness or inability to communicate  · Depression  · Unusual sadness, discouragement and loneliness  · Talk of wanting to die  · Neglect of personal appearance   · Rebelliousness- reckless behavior  · Withdrawal from people/activities they love  · Confusion- inability to concentrate     If you or a loved one observes any of these behaviors or has concerns about self-harm, here's what you can do:  · Talk about it- your feelings and reasons for harming yourself  · Remove any means that you might use to hurt yourself (examples: pills, rope, extension cords, firearm)  · Get professional help  from the community (Mental Health, Substance Abuse, psychological counseling)  · Do not be alone:Call your Safe Contact- someone whom you trust who will be there for you.  · Call your local CRISIS HOTLINE 459-4423 or 098-390-1396  · Call your local Children's Mobile Crisis Response Team [place] (152) 290-4081 or www.[employer]  · Call the toll free National Suicide Prevention Hotlines   · National Suicide Prevention Lifeline 860-516-KMDQ (4443)  · [employer] Line Network 800-SUICIDE (317-4537)      Discharge Instructions per Anthony Montaño M.D.    Please follow up in the cardiology office    DIET: cardiac diet    ACTIVITY: slow tapering up of activity    DIAGNOSIS: heart attack    Return to ER if symptoms worsen such as chest pain, sweating, or shortness of breath.

## 2019-12-17 NOTE — PROGRESS NOTES
Bedside report received from nurse. Assumed care of pt. Pt ambulated to bathroom then returned comfortably to bed. Steady gait. A/Ox4, VSS, All needs met. POC reviewed and white board updated. Tele box on. Call light in reach. Bed locked in lowest position with 2 upper bed rails up. No pain or complaints

## 2019-12-17 NOTE — DISCHARGE SUMMARY
Discharge Summary    CHIEF COMPLAINT ON ADMISSION  No chief complaint on file.      Reason for Admission  Stemi     Admission Date  12/14/2019    CODE STATUS  Full Code    HPI & HOSPITAL COURSE  This is a 89 y.o. male here with chest pain. Mr. Frederick a past history of seizure disorder presented in the emergency room with chest pain found to have ST elevation myocardial infarction therefore emergently was brought to the cardiac Cath Lab he was found to have multivessel disease and had 3 drug-eluting stents placed as noted below with subsequent admission to the intensive care unit.  He was found to have an ejection fraction of 30%.  He was initiated on dual antiplatelet therapy though he was not able tolerate a beta-blocker nor ACE inhibitor until today due to low blood pressure.  Today his blood pressure little bit elevated and 25 of the Toprol has been initiated.  He has been treated with a high intensity statin.    Today, he is able to ambulate unassisted, is on room air, and tolerating regular diet.  He has no chest pain.  We discussed getting him into cardiac rehab in [facility].  He lives with his wife who is in excellent health and she is coming to pick him up today.    Mr. Frederick is anxious to get back to his quite robust usual activities we discussed he will need to take it easy with a slow tapering up of activities and of course return the emergency room for diaphoresis, chest pain, or shortness of breath.         Therefore, he is discharged in good and stable condition to home with close outpatient follow-up.    The patient met 2-midnight criteria for an inpatient stay at the time of discharge.    Discharge Date  12/17/19    FOLLOW UP ITEMS POST DISCHARGE  Cardiology  Cardiac rehab    DISCHARGE DIAGNOSES  Principal Problem:    ST elevation myocardial infarction involving left anterior descending (LAD) coronary artery ([facility]) POA: Unknown  Active Problems:    RENATA (acute kidney injury) ([facility]) POA:  Unknown    Hyperglycemia POA: Unknown    Acute respiratory failure with hypoxia (HCC) POA: Unknown    Seizure (HCC) POA: Unknown    LFT elevation POA: Unknown    Pulmonary edema POA: Unknown    Ischemic cardiomyopathy POA: Unknown  Resolved Problems:    * No resolved hospital problems. *      FOLLOW UP  No future appointments.  [facility]  [address]  [address]  [place] [address] [phone]  Call in 1 week  Your physician has referred you to Intensive Cardiac Rehab. You cannot begin ICR for 2 weeks to allow time for your heart to heal. If you do not hear from ICR in about 1 week, please call them to schedule. Thank you!      MEDICATIONS ON DISCHARGE     Medication List      START taking these medications      Instructions   aspirin 81 MG EC tablet  Start taking on:  December 18, 2019   Take 1 Tab by mouth every day.  Dose:  81 mg     atorvastatin 40 MG Tabs  Commonly known as:  LIPITOR   Take 1 Tab by mouth every bedtime.  Dose:  40 mg     clopidogrel 75 MG Tabs  Start taking on:  December 18, 2019  Commonly known as:  PLAVIX   Take 1 Tab by mouth every day.  Dose:  75 mg     metoprolol SR 25 MG Tb24  Commonly known as:  TOPROL XL   Take 1 Tab by mouth every day.  Dose:  25 mg        CONTINUE taking these medications      Instructions   levETIRAcetam 500 MG Tabs  Commonly known as:  KEPPRA   Take 500 mg by mouth 2 times a day.  Dose:  500 mg            Allergies  Allergies   Allergen Reactions   • Penicillins Rash     Experienced over 50 years ago       DIET  Orders Placed This Encounter   Procedures   • Diet Order Cardiac     Standing Status:   Standing     Number of Occurrences:   1     Order Specific Question:   Diet:     Answer:   Cardiac [6]       ACTIVITY  Slow tapering up of his activities  Outpatient cardiac rehab in [place] has been ordered    CONSULTATIONS  Cardiology     PROCEDURES  Heart catheterization by Dr. Apodaca on 12/14:     PERCUTANEOUS CORONARY  INTERVENTION:  1.  Ostial left anterior descending artery, concentric 99% stenosis with 0%   residual.  FADI-1 flow to FADI-3 flow.  Predilatation with 2.5x15 mm Emerge   balloon.  Stent with 2.5x12 mm Resolute drug-eluting stent.  2.  Mid left anterior descending artery concentric 90% stenosis with 0%   residual.  Predilatation with 2.5x15 mm Emerge balloon.  Stent with 2.5x22 mm   [place] drug-eluting stent.  Postdilatation with 2.5x15 mm [place] Emerge balloon.  3.  Proximal diagonal branch, concentric 95% stenosis with 0% residual.    Predilatation with 2.5x15 mm Emerge balloon.  Stent with 2.5x15 mm Resolute   drug-eluting stent.     IMPRESSION:   1.  Multivessel coronary artery disease with high-grade ostial left anterior   descending artery, mid left anterior descending artery, proximal diagonal   branch stenosis and nonobstructive proximal right coronary artery stenosis.  2.  Successful percutaneous transluminal coronary angioplasty/stent placement   of the ostial left anterior descending artery with 2.5x12 mm Resolute   drug-eluting stent.  3.  Successful percutaneous transluminal coronary angioplasty/stent placement   of the mid left anterior descending artery with 2.5x22 mm [place] drug-eluting   stent.  4.  Successful percutaneous transluminal coronary angioplasty/stent placement   of the proximal diagonal branch with 2.5x15 mm Resolute drug-eluting stent.  5.  Reduced left ventricular systolic function with ejection fraction of 30%,   anterior wall hypokinesis.  6.  Elevated left ventricular end-diastolic pressure.    LABORATORY  Lab Results   Component Value Date    SODIUM 137 12/17/2019    POTASSIUM 4.0 12/17/2019    CHLORIDE 104 12/17/2019    CO2 23 12/17/2019    GLUCOSE 116 (H) 12/17/2019    BUN 12 12/17/2019    CREATININE 1.03 12/17/2019    TSH 2.2  HDL 42  LDL 65  Lab Results   Component Value Date    WBC 8.3 12/17/2019    HEMOGLOBIN 12.3 (L) 12/17/2019    HEMATOCRIT 36.3 (L) 12/17/2019    PLATELETCT 192  12/17/2019    echocardiogram:  CONCLUSIONS  No prior study is available for comparison.   Severely reduced left ventricular systolic function. Left ventricular   ejection fraction is visually estimated to be 30%.  No evidence of LV thrombus.  Dilated inferior vena cava without inspiratory collapse.  Right ventricular systolic pressure is estimated to be 60 mmHg.  Chart reviewed. Team aware of cardiomyopathy.  Imaging studies:  EC-ECHOCARDIOGRAM COMPLETE W/ CONT   Final Result      DX-CHEST-PORTABLE (1 VIEW)   Final Result         1.  Ill-defined pulmonary opacifications have decreased compared to prior radiograph. Findings could indicate decrease in pulmonary edema or inflammation.      2.  Linear opacifications in each lung base could be due to discoid atelectasis.      DX-CHEST-PORTABLE (1 VIEW)   Final Result         1.  Perihilar and interstitial opacifications are noted which could be due to edema or inflammation.      2.  No consolidations identified.      CL-LEFT HEART CATHETERIZATION WITH POSSIBLE INTERVENTION    (Results Pending)       Total time of the discharge process exceeds 32 minutes.

## 2019-12-17 NOTE — PROGRESS NOTES
Patient ambulated from T610 to T736-2 with this RN, chart and belongings. Report given to Erin LONG. Patient updated his wife.

## 2019-12-17 NOTE — CARE PLAN
Problem: Communication  Goal: The ability to communicate needs accurately and effectively will improve  Outcome: PROGRESSING AS EXPECTED     Problem: Safety  Goal: Will remain free from injury  Outcome: PROGRESSING AS EXPECTED  Goal: Will remain free from falls  Outcome: PROGRESSING AS EXPECTED  Note:   Pt remains free from falls at this time. Safety precautions in place. Pt educated on calling for assistance when needed.        Problem: Venous Thromboembolism (VTW)/Deep Vein Thrombosis (DVT) Prevention:  Goal: Patient will participate in Venous Thrombosis (VTE)/Deep Vein Thrombosis (DVT)Prevention Measures  Outcome: PROGRESSING AS EXPECTED     Problem: Knowledge Deficit  Goal: Knowledge of disease process/condition, treatment plan, diagnostic tests, and medications will improve  Outcome: PROGRESSING AS EXPECTED  Note:   Pt updated on POC, tests, and medications. Pt verbalizes understanding and has no further questions at this time. Pt educated on calling for any more questions.

## 2019-12-18 ENCOUNTER — TELEPHONE (OUTPATIENT)
Dept: CARDIOLOGY | Facility: MEDICAL CENTER | Age: 84
End: 2019-12-18

## 2019-12-18 DIAGNOSIS — I25.5 ISCHEMIC CARDIOMYOPATHY: ICD-10-CM

## 2019-12-18 DIAGNOSIS — I21.02 ST ELEVATION MYOCARDIAL INFARCTION INVOLVING LEFT ANTERIOR DESCENDING (LAD) CORONARY ARTERY (HCC): ICD-10-CM

## 2019-12-18 NOTE — TELEPHONE ENCOUNTER
TONYA Godinez,    Pt's wife Itzel says that her  was seen at the hospital by TONYA. He has a HFV appt on 12/23 for Naren. Itzel wanted to know if Dr. Chery can send order for Cardiac Rehab to [facility]. Fax number is: [phone]. Itzel would like a call back at: [phone].    Thank you so much,    Rica

## 2019-12-19 NOTE — TELEPHONE ENCOUNTER
Contacted patient, s/w with wife and patient on speaker phone.  Explained will submit referral to [facility].    Advised to contact clinic if patient is not contacted after 2 weeks.  Patient verbalizes understanding.

## 2019-12-23 ENCOUNTER — OFFICE VISIT (OUTPATIENT)
Dept: CARDIOLOGY | Facility: MEDICAL CENTER | Age: 84
End: 2019-12-23
Payer: MEDICARE

## 2019-12-23 VITALS
HEART RATE: 76 BPM | SYSTOLIC BLOOD PRESSURE: 120 MMHG | BODY MASS INDEX: 23.05 KG/M2 | DIASTOLIC BLOOD PRESSURE: 64 MMHG | WEIGHT: 161 LBS | HEIGHT: 70 IN | OXYGEN SATURATION: 96 %

## 2019-12-23 DIAGNOSIS — I25.10 CORONARY ARTERY DISEASE INVOLVING NATIVE CORONARY ARTERY OF NATIVE HEART WITHOUT ANGINA PECTORIS: ICD-10-CM

## 2019-12-23 DIAGNOSIS — I25.5 ISCHEMIC CARDIOMYOPATHY: ICD-10-CM

## 2019-12-23 DIAGNOSIS — I35.8 AORTIC VALVE SCLEROSIS: ICD-10-CM

## 2019-12-23 DIAGNOSIS — Z95.5 STATUS POST INSERTION OF DRUG ELUTING CORONARY ARTERY STENT: ICD-10-CM

## 2019-12-23 DIAGNOSIS — E78.5 DYSLIPIDEMIA: ICD-10-CM

## 2019-12-23 PROCEDURE — 99214 OFFICE O/P EST MOD 30 MIN: CPT | Performed by: NURSE PRACTITIONER

## 2019-12-23 RX ORDER — CODEINE PHOSPHATE AND GUAIFENESIN 10; 100 MG/5ML; MG/5ML
5-10 SOLUTION ORAL
COMMUNITY
Start: 2015-01-22 | End: 2019-12-23

## 2019-12-23 RX ORDER — OXCARBAZEPINE 300 MG/1
300 TABLET, FILM COATED ORAL
COMMUNITY
Start: 2009-10-31 | End: 2019-12-23

## 2019-12-23 RX ORDER — LEVETIRACETAM 500 MG/1
500 TABLET ORAL
COMMUNITY
End: 2019-12-23

## 2019-12-23 RX ORDER — CLOPIDOGREL BISULFATE 75 MG/1
75 TABLET ORAL DAILY
Qty: 90 TAB | Refills: 3 | Status: SHIPPED | OUTPATIENT
Start: 2019-12-23

## 2019-12-23 RX ORDER — ATORVASTATIN CALCIUM 40 MG/1
40 TABLET, FILM COATED ORAL EVERY EVENING
Qty: 90 TAB | Refills: 3 | Status: SHIPPED | OUTPATIENT
Start: 2019-12-23

## 2019-12-23 RX ORDER — METOPROLOL SUCCINATE 25 MG/1
25 TABLET, EXTENDED RELEASE ORAL EVERY EVENING
Qty: 90 TAB | Refills: 3 | Status: SHIPPED | OUTPATIENT
Start: 2019-12-23

## 2019-12-23 ASSESSMENT — ENCOUNTER SYMPTOMS
DIZZINESS: 0
MYALGIAS: 0
PALPITATIONS: 0
ORTHOPNEA: 0
ABDOMINAL PAIN: 0
SHORTNESS OF BREATH: 0
CLAUDICATION: 0
WEAKNESS: 0
COUGH: 1
NECK PAIN: 1
PND: 0

## 2019-12-23 NOTE — LETTER
[facility]   [address], [address]  [place] [address]  Phone: [phone]  Fax: [phone]              Derek Frederick  11/28/1929    Encounter Date: 12/23/2019    JUANCHO Tamez          PROGRESS NOTE:  Chief Complaint   Patient presents with   • Cardiomyopathy (Ischemic)       Subjective:   Derek Frederick is a 89 y.o. male who presents today with his wife, Madonna, for hospital follow-up.  He presented to [facility] with chest pain and was transferred down to [facility] [place] [facility] where he underwent cardiac catheterization.  He received 3 drug-eluting stents to the LAD and diagonal.  He was found to have an ischemic cardiomyopathy with ejection fraction of 30%.    Previously he was healthy and up until last year he skied at [employer].  Also he used to ride a bicycle.  He stopped these things as he was worried about injury since he is 89 years old.  His previous health history included seizures that are well controlled on medication.    Since been out of the hospital, he has not had any chest tightness, heaviness or pressure.  He has some minor shortness of breath with exertion.  He complains of some neck and shoulder pain.  He also has a dry cough.    Past Medical History:   Diagnosis Date   • CAD (coronary artery disease)      Past Surgical History:   Procedure Laterality Date   • ZZZ CARDIAC CATH  12/14/2019    3 MERLENE to LAD     History reviewed. No pertinent family history.  Social History     Socioeconomic History   • Marital status: Not on file     Spouse name: Not on file   • Number of children: Not on file   • Years of education: Not on file   • Highest education level: Not on file   Occupational History   • Not on file   Social Needs   • Financial resource strain: Not on file   • Food insecurity:     Worry: Never true     Inability: Never true   • Transportation needs:     Medical: No     Non-medical: No   Tobacco Use   • Smoking status: Never Smoker   •  Smokeless tobacco: Never Used   Substance and Sexual Activity   • Alcohol use: Yes     Frequency: 4 or more times a week     Drinks per session: 1 or 2     Binge frequency: Never     Comment: beer or wine with dinner   • Drug use: Never   • Sexual activity: Not on file   Lifestyle   • Physical activity:     Days per week: Not on file     Minutes per session: Not on file   • Stress: Not on file   Relationships   • Social connections:     Talks on phone: Not on file     Gets together: Not on file     Attends Islam service: Not on file     Active member of club or organization: Not on file     Attends meetings of clubs or organizations: Not on file     Relationship status: Not on file   • Intimate partner violence:     Fear of current or ex partner: Not on file     Emotionally abused: Not on file     Physically abused: Not on file     Forced sexual activity: Not on file   Other Topics Concern   • Not on file   Social History Narrative   • Not on file     Allergies   Allergen Reactions   • Penicillins Rash     Experienced over 50 years ago     Outpatient Encounter Medications as of 12/23/2019   Medication Sig Dispense Refill   • aspirin 81 MG tablet TAKE 1 TABLET BY MOUTH EVERY DAY     • atorvastatin (LIPITOR) 40 MG Tab Take 1 Tab by mouth every evening. 90 Tab 3   • clopidogrel (PLAVIX) 75 MG Tab Take 1 Tab by mouth every day. 90 Tab 3   • metoprolol SR (TOPROL XL) 25 MG TABLET SR 24 HR Take 1 Tab by mouth every evening. 90 Tab 3   • levETIRAcetam (KEPPRA) 500 MG Tab Take 500 mg by mouth 2 times a day.     • [DISCONTINUED] levETIRAcetam (KEPPRA) 500 MG Tab Take 500 mg by mouth.     • [DISCONTINUED] OXcarbazepine (TRILEPTAL) 300 MG Tab Take 300 mg by mouth.     • [DISCONTINUED] guaifenesin-codeine (ROBITUSSIN AC) Solution oral solution Take 5-10 mL by mouth.     • [DISCONTINUED] aspirin 81 MG tablet Take  by mouth.     • [DISCONTINUED] aspirin EC 81 MG EC tablet Take 1 Tab by mouth every day. (Patient not taking:  "Reported on 12/23/2019) 30 Tab 11   • [DISCONTINUED] atorvastatin (LIPITOR) 40 MG Tab Take 1 Tab by mouth every bedtime. 30 Tab 11   • [DISCONTINUED] clopidogrel (PLAVIX) 75 MG Tab Take 1 Tab by mouth every day. 30 Tab 11   • [DISCONTINUED] metoprolol SR (TOPROL XL) 25 MG TABLET SR 24 HR Take 1 Tab by mouth every day. 30 Tab 11     No facility-administered encounter medications on file as of 12/23/2019.      Review of Systems   Constitutional: Negative for malaise/fatigue.   Respiratory: Positive for cough (Dry bronchospastic cough.). Negative for shortness of breath.    Cardiovascular: Negative for chest pain, palpitations, orthopnea, claudication, leg swelling and PND.   Gastrointestinal: Negative for abdominal pain.   Musculoskeletal: Positive for neck pain (chronic). Negative for myalgias.        Muscular pain in his shoulder particularly the right.   Neurological: Negative for dizziness and weakness.        Objective:   /64 (BP Location: Left arm, Patient Position: Sitting, BP Cuff Size: Adult)   Pulse 76   Ht 1.778 m (5' 10\")   Wt 73 kg (161 lb)   SpO2 96%   BMI 23.10 kg/m²      Physical Exam   Constitutional: He is oriented to person, place, and time. He appears well-developed and well-nourished.   Appears younger than stated age.   HENT:   Head: Normocephalic.   Eyes: EOM are normal.   Neck: Normal range of motion. No JVD present.   Cardiovascular: Normal rate and regular rhythm. Exam reveals no friction rub.   Murmur heard.   Systolic murmur is present with a grade of 2/6.  Pulmonary/Chest: Effort normal.   Abdominal: Soft. Bowel sounds are normal.   Musculoskeletal:         General: Edema (Trace to 1+ bilateral ankle edema this afternoon.) present.   Neurological: He is alert and oriented to person, place, and time.   Skin: Skin is warm and dry.   Psychiatric: He has a normal mood and affect.       Heart catheterization by Dr. Apodaca on 12/14/19:     PERCUTANEOUS CORONARY INTERVENTION:  1. "  Ostial left anterior descending artery, concentric 99% stenosis with 0%   residual.  FADI-1 flow to FADI-3 flow.  Predilatation with 2.5x15 mm Emerge   balloon.  Stent with 2.5x12 mm Resolute drug-eluting stent.  2.  Mid left anterior descending artery concentric 90% stenosis with 0%   residual.  Predilatation with 2.5x15 mm Emerge balloon.  Stent with 2.5x22 mm   [place] drug-eluting stent.  Postdilatation with 2.5x15 mm NC Emerge balloon.  3.  Proximal diagonal branch, concentric 95% stenosis with 0% residual.    Predilatation with 2.5x15 mm Emerge balloon.  Stent with 2.5x15 mm Resolute   drug-eluting stent.     IMPRESSION:   1.  Multivessel coronary artery disease with high-grade ostial left anterior   descending artery, mid left anterior descending artery, proximal diagonal   branch stenosis and nonobstructive proximal right coronary artery stenosis.  2.  Successful percutaneous transluminal coronary angioplasty/stent placement   of the ostial left anterior descending artery with 2.5x12 mm Resolute   drug-eluting stent.  3.  Successful percutaneous transluminal coronary angioplasty/stent placement   of the mid left anterior descending artery with 2.5x22 mm [place] drug-eluting   stent.  4.  Successful percutaneous transluminal coronary angioplasty/stent placement   of the proximal diagonal branch with 2.5x15 mm Resolute drug-eluting stent.  5.  Reduced left ventricular systolic function with ejection fraction of 30%,   anterior wall hypokinesis.      December 16th 2019: Transthoracic Echo Report   CONCLUSIONS  No prior study is available for comparison.   Severely reduced left ventricular systolic function. Left ventricular   ejection fraction is visually estimated to be 30%.  No evidence of LV thrombus.  Dilated inferior vena cava without inspiratory collapse.  Right ventricular systolic pressure is estimated to be 60 mmHg.  Chart reviewed. Team aware of cardiomyopathy.      Results for NIRMALA HARDIN (MRN [id number])       Ref. Range 12/15/2019 04:37   Cholesterol,Tot Latest Ref Range: 100 - 199 mg/dL 129   Triglycerides Latest Ref Range: 0 - 149 mg/dL 110   HDL Latest Ref Range: >=40 mg/dL 42   LDL Latest Ref Range: <100 mg/dL 65     Results for NIRMALA HARDIN (MRN [id number])    Ref. Range 12/17/2019 03:00   Sodium Latest Ref Range: 135 - 145 mmol/L 137   Potassium Latest Ref Range: 3.6 - 5.5 mmol/L 4.0   Chloride Latest Ref Range: 96 - 112 mmol/L 104   Co2 Latest Ref Range: 20 - 33 mmol/L 23   Anion Gap Latest Ref Range: 0.0 - 11.9  10.0   Glucose Latest Ref Range: 65 - 99 mg/dL 116 (H)   Bun Latest Ref Range: 8 - 22 mg/dL 12   Creatinine Latest Ref Range: 0.50 - 1.40 mg/dL 1.03   GFR If  Latest Ref Range: >60 mL/min/1.73 m 2 >60   GFR If Non  Latest Ref Range: >60 mL/min/1.73 m 2 >60   Calcium Latest Ref Range: 8.5 - 10.5 mg/dL 8.7   AST(SGOT) Latest Ref Range: 12 - 45 U/L 119 (H)   ALT(SGPT) Latest Ref Range: 2 - 50 U/L 42   Alkaline Phosphatase Latest Ref Range: 30 - 99 U/L 71   Total Bilirubin Latest Ref Range: 0.1 - 1.5 mg/dL 0.8   Albumin Latest Ref Range: 3.2 - 4.9 g/dL 3.6   Total Protein Latest Ref Range: 6.0 - 8.2 g/dL 6.7   Globulin Latest Ref Range: 1.9 - 3.5 g/dL 3.1   A-G Ratio Latest Units: g/dL 1.2   Phosphorus Latest Ref Range: 2.5 - 4.5 mg/dL 2.1 (L)   Magnesium Latest Ref Range: 1.5 - 2.5 mg/dL 1.7         Assessment:     1. Coronary artery disease involving native coronary artery of native heart without angina pectoris     2. Status post insertion of drug eluting coronary artery stent     3. Ischemic cardiomyopathy     4. Dyslipidemia     5. 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         Medical Decision Making:  Today's Assessment / Status / Plan:   Coronary artery disease: Status post 3 drug-eluting stents to his LAD and diagonal.  He has not had any anginal symptoms since being out of the hospital.  He understands to take dual antiplatelet therapy for 1 year.  He will attend  cardiac rehab in [place].    Ischemic cardiomyopathy: His ejection fraction is 30%.  Probably due to his recent MI.  He has no fluid overload signs and does not appear to need diuretics.  I am hoping that his ejection fraction will improve.    Dyslipidemia: LDL goal is less than 70.  His lipids done while in the hospital were to goal.  I will leave it up to his primary care or follow-up with cardiologist to check lipids.    Aortic sclerosis without stenosis: He does have a murmur which is probably due to calcification of the aortic valve.  He appears to be asymptomatic.    He lives in [place] and has seen Dr. Guzman previously and most likely would like to follow with him in [place].  However he is welcome to follow-up here if he so chooses.  He will need to be seen in approximately 3 months.  Sooner if problems.    Collaborating Provider: Dr. Padilla.    Please note that this dictation was created using voice recognition software. I have made every reasonable attempt to correct obvious errors, but it is possible there are errors of grammar and possibly content that I did not discover before finalizing the note.          No Recipients

## 2019-12-23 NOTE — PROGRESS NOTES
Chief Complaint   Patient presents with   • Cardiomyopathy (Ischemic)       Subjective:   Derek Frederick is a 89 y.o. male who presents today with his wife, Madonna, for hospital follow-up.  He presented to [facility] with chest pain and was transferred down to [facility] where he underwent cardiac catheterization.  He received 3 drug-eluting stents to the LAD and diagonal.  He was found to have an ischemic cardiomyopathy with ejection fraction of 30%.    Previously he was healthy and up until last year he skied at [employer].  Also he used to ride a bicycle.  He stopped these things as he was worried about injury since he is 89 years old.  His previous health history included seizures that are well controlled on medication.    Since been out of the hospital, he has not had any chest tightness, heaviness or pressure.  He has some minor shortness of breath with exertion.  He complains of some neck and shoulder pain.  He also has a dry cough.    Past Medical History:   Diagnosis Date   • CAD (coronary artery disease)      Past Surgical History:   Procedure Laterality Date   • ZZZ CARDIAC CATH  12/14/2019    3 MERLENE to LAD     History reviewed. No pertinent family history.  Social History     Socioeconomic History   • Marital status: Not on file     Spouse name: Not on file   • Number of children: Not on file   • Years of education: Not on file   • Highest education level: Not on file   Occupational History   • Not on file   Social Needs   • Financial resource strain: Not on file   • Food insecurity:     Worry: Never true     Inability: Never true   • Transportation needs:     Medical: No     Non-medical: No   Tobacco Use   • Smoking status: Never Smoker   • Smokeless tobacco: Never Used   Substance and Sexual Activity   • Alcohol use: Yes     Frequency: 4 or more times a week     Drinks per session: 1 or 2     Binge frequency: Never     Comment: beer or wine with dinner   • Drug use: Never   • Sexual activity: Not on  file   Lifestyle   • Physical activity:     Days per week: Not on file     Minutes per session: Not on file   • Stress: Not on file   Relationships   • Social connections:     Talks on phone: Not on file     Gets together: Not on file     Attends [employer] service: Not on file     Active member of club or organization: Not on file     Attends meetings of clubs or organizations: Not on file     Relationship status: Not on file   • Intimate partner violence:     Fear of current or ex partner: Not on file     Emotionally abused: Not on file     Physically abused: Not on file     Forced sexual activity: Not on file   Other Topics Concern   • Not on file   Social History Narrative   • Not on file     Allergies   Allergen Reactions   • Penicillins Rash     Experienced over 50 years ago     Outpatient Encounter Medications as of 12/23/2019   Medication Sig Dispense Refill   • aspirin 81 MG tablet TAKE 1 TABLET BY MOUTH EVERY DAY     • atorvastatin (LIPITOR) 40 MG Tab Take 1 Tab by mouth every evening. 90 Tab 3   • clopidogrel (PLAVIX) 75 MG Tab Take 1 Tab by mouth every day. 90 Tab 3   • metoprolol SR (TOPROL XL) 25 MG TABLET SR 24 HR Take 1 Tab by mouth every evening. 90 Tab 3   • levETIRAcetam (KEPPRA) 500 MG Tab Take 500 mg by mouth 2 times a day.     • [DISCONTINUED] levETIRAcetam (KEPPRA) 500 MG Tab Take 500 mg by mouth.     • [DISCONTINUED] OXcarbazepine (TRILEPTAL) 300 MG Tab Take 300 mg by mouth.     • [DISCONTINUED] guaifenesin-codeine (ROBITUSSIN AC) Solution oral solution Take 5-10 mL by mouth.     • [DISCONTINUED] aspirin 81 MG tablet Take  by mouth.     • [DISCONTINUED] aspirin EC 81 MG EC tablet Take 1 Tab by mouth every day. (Patient not taking: Reported on 12/23/2019) 30 Tab 11   • [DISCONTINUED] atorvastatin (LIPITOR) 40 MG Tab Take 1 Tab by mouth every bedtime. 30 Tab 11   • [DISCONTINUED] clopidogrel (PLAVIX) 75 MG Tab Take 1 Tab by mouth every day. 30 Tab 11   • [DISCONTINUED] metoprolol SR (TOPROL XL)  "25 MG TABLET SR 24 HR Take 1 Tab by mouth every day. 30 Tab 11     No facility-administered encounter medications on file as of 12/23/2019.      Review of Systems   Constitutional: Negative for malaise/fatigue.   Respiratory: Positive for cough (Dry bronchospastic cough.). Negative for shortness of breath.    Cardiovascular: Negative for chest pain, palpitations, orthopnea, claudication, leg swelling and PND.   Gastrointestinal: Negative for abdominal pain.   Musculoskeletal: Positive for neck pain (chronic). Negative for myalgias.        Muscular pain in his shoulder particularly the right.   Neurological: Negative for dizziness and weakness.        Objective:   /64 (BP Location: Left arm, Patient Position: Sitting, BP Cuff Size: Adult)   Pulse 76   Ht 1.778 m (5' 10\")   Wt 73 kg (161 lb)   SpO2 96%   BMI 23.10 kg/m²     Physical Exam   Constitutional: He is oriented to person, place, and time. He appears well-developed and well-nourished.   Appears younger than stated age.   HENT:   Head: Normocephalic.   Eyes: EOM are normal.   Neck: Normal range of motion. No JVD present.   Cardiovascular: Normal rate and regular rhythm. Exam reveals no friction rub.   Murmur heard.   Systolic murmur is present with a grade of 2/6.  Pulmonary/Chest: Effort normal.   Abdominal: Soft. Bowel sounds are normal.   Musculoskeletal:         General: Edema (Trace to 1+ bilateral ankle edema this afternoon.) present.   Neurological: He is alert and oriented to person, place, and time.   Skin: Skin is warm and dry.   Psychiatric: He has a normal mood and affect.       Heart catheterization by Dr. Apodaca on 12/14/19:     PERCUTANEOUS CORONARY INTERVENTION:  1.  Ostial left anterior descending artery, concentric 99% stenosis with 0%   residual.  FADI-1 flow to FADI-3 flow.  Predilatation with 2.5x15 mm Emerge   balloon.  Stent with 2.5x12 mm Resolute drug-eluting stent.  2.  Mid left anterior descending artery concentric 90% " stenosis with 0%   residual.  Predilatation with 2.5x15 mm Emerge balloon.  Stent with 2.5x22 mm   [place] drug-eluting stent.  Postdilatation with 2.5x15 mm NC Emerge balloon.  3.  Proximal diagonal branch, concentric 95% stenosis with 0% residual.    Predilatation with 2.5x15 mm Emerge balloon.  Stent with 2.5x15 mm Resolute   drug-eluting stent.     IMPRESSION:   1.  Multivessel coronary artery disease with high-grade ostial left anterior   descending artery, mid left anterior descending artery, proximal diagonal   branch stenosis and nonobstructive proximal right coronary artery stenosis.  2.  Successful percutaneous transluminal coronary angioplasty/stent placement   of the ostial left anterior descending artery with 2.5x12 mm Resolute   drug-eluting stent.  3.  Successful percutaneous transluminal coronary angioplasty/stent placement   of the mid left anterior descending artery with 2.5x22 mm Elliot drug-eluting   stent.  4.  Successful percutaneous transluminal coronary angioplasty/stent placement   of the proximal diagonal branch with 2.5x15 mm Resolute drug-eluting stent.  5.  Reduced left ventricular systolic function with ejection fraction of 30%,   anterior wall hypokinesis.      December 16th 2019: Transthoracic Echo Report   CONCLUSIONS  No prior study is available for comparison.   Severely reduced left ventricular systolic function. Left ventricular   ejection fraction is visually estimated to be 30%.  No evidence of LV thrombus.  Dilated inferior vena cava without inspiratory collapse.  Right ventricular systolic pressure is estimated to be 60 mmHg.  Chart reviewed. Team aware of cardiomyopathy.      Results for NIRMALA HARDIN (MRN [id number])    Ref. Range 12/15/2019 04:37   Cholesterol,Tot Latest Ref Range: 100 - 199 mg/dL 129   Triglycerides Latest Ref Range: 0 - 149 mg/dL 110   HDL Latest Ref Range: >=40 mg/dL 42   LDL Latest Ref Range: <100 mg/dL 65     Results for NIRMALA HARDIN (MRN [id number])    Ref.  Range 12/17/2019 03:00   Sodium Latest Ref Range: 135 - 145 mmol/L 137   Potassium Latest Ref Range: 3.6 - 5.5 mmol/L 4.0   Chloride Latest Ref Range: 96 - 112 mmol/L 104   Co2 Latest Ref Range: 20 - 33 mmol/L 23   Anion Gap Latest Ref Range: 0.0 - 11.9  10.0   Glucose Latest Ref Range: 65 - 99 mg/dL 116 (H)   Bun Latest Ref Range: 8 - 22 mg/dL 12   Creatinine Latest Ref Range: 0.50 - 1.40 mg/dL 1.03   GFR If  Latest Ref Range: >60 mL/min/1.73 m 2 >60   GFR If Non  Latest Ref Range: >60 mL/min/1.73 m 2 >60   Calcium Latest Ref Range: 8.5 - 10.5 mg/dL 8.7   AST(SGOT) Latest Ref Range: 12 - 45 U/L 119 (H)   ALT(SGPT) Latest Ref Range: 2 - 50 U/L 42   Alkaline Phosphatase Latest Ref Range: 30 - 99 U/L 71   Total Bilirubin Latest Ref Range: 0.1 - 1.5 mg/dL 0.8   Albumin Latest Ref Range: 3.2 - 4.9 g/dL 3.6   Total Protein Latest Ref Range: 6.0 - 8.2 g/dL 6.7   Globulin Latest Ref Range: 1.9 - 3.5 g/dL 3.1   A-G Ratio Latest Units: g/dL 1.2   Phosphorus Latest Ref Range: 2.5 - 4.5 mg/dL 2.1 (L)   Magnesium Latest Ref Range: 1.5 - 2.5 mg/dL 1.7         Assessment:     1. Coronary artery disease involving native coronary artery of native heart without angina pectoris     2. Status post insertion of drug eluting coronary artery stent     3. Ischemic cardiomyopathy     4. Dyslipidemia     5. 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         Medical Decision Making:  Today's Assessment / Status / Plan:   Coronary artery disease: Status post 3 drug-eluting stents to his LAD and diagonal.  He has not had any anginal symptoms since being out of the hospital.  He understands to take dual antiplatelet therapy for 1 year.  He will attend cardiac rehab in [place].    Ischemic cardiomyopathy: His ejection fraction is 30%.  Probably due to his recent MI.  He has no fluid overload signs and does not appear to need diuretics.  I am hoping that his ejection fraction will improve.    Dyslipidemia: LDL goal  is less than 70.  His lipids done while in the hospital were to goal.  I will leave it up to his primary care or follow-up with cardiologist to check lipids.    Aortic sclerosis without stenosis: He does have a murmur which is probably due to calcification of the aortic valve.  He appears to be asymptomatic.    He lives in [place] and has seen Dr. Guzman previously and most likely would like to follow with him in [place].  However he is welcome to follow-up here if he so chooses.  He will need to be seen in approximately 3 months.  Sooner if problems.    Collaborating Provider: Dr. Padilla.    Please note that this dictation was created using voice recognition software. I have made every reasonable attempt to correct obvious errors, but it is possible there are errors of grammar and possibly content that I did not discover before finalizing the note.